# Patient Record
Sex: FEMALE | Race: WHITE | NOT HISPANIC OR LATINO | Employment: OTHER | URBAN - METROPOLITAN AREA
[De-identification: names, ages, dates, MRNs, and addresses within clinical notes are randomized per-mention and may not be internally consistent; named-entity substitution may affect disease eponyms.]

---

## 2019-01-21 ENCOUNTER — HOSPITAL ENCOUNTER (OUTPATIENT)
Dept: MAMMOGRAPHY | Facility: CLINIC | Age: 64
Discharge: HOME | End: 2019-01-21
Attending: OBSTETRICS & GYNECOLOGY | Admitting: OBSTETRICS & GYNECOLOGY
Payer: COMMERCIAL

## 2019-01-21 ENCOUNTER — TRANSFERRED RECORDS (OUTPATIENT)
Dept: HEALTH INFORMATION MANAGEMENT | Facility: CLINIC | Age: 64
End: 2019-01-21

## 2019-01-21 DIAGNOSIS — Z12.31 VISIT FOR SCREENING MAMMOGRAM: ICD-10-CM

## 2019-01-21 LAB
GLUCOSE SERPL-MCNC: 102 MG/DL (ref 74–106)
HBA1C MFR BLD: 5.5 % (ref 0–5.7)
TSH SERPL-ACNC: 0.99 UIU/ML (ref 0.36–3.74)

## 2019-01-21 PROCEDURE — 77063 BREAST TOMOSYNTHESIS BI: CPT

## 2019-01-23 ENCOUNTER — HOSPITAL ENCOUNTER (OUTPATIENT)
Dept: MAMMOGRAPHY | Facility: CLINIC | Age: 64
Discharge: HOME | End: 2019-01-23
Attending: OBSTETRICS & GYNECOLOGY | Admitting: OBSTETRICS & GYNECOLOGY
Payer: COMMERCIAL

## 2019-01-23 DIAGNOSIS — R92.8 ABNORMAL MAMMOGRAM: ICD-10-CM

## 2019-01-23 PROCEDURE — 76642 ULTRASOUND BREAST LIMITED: CPT | Mod: RT

## 2019-02-15 ENCOUNTER — HEALTH MAINTENANCE LETTER (OUTPATIENT)
Age: 64
End: 2019-02-15

## 2019-11-04 ENCOUNTER — HEALTH MAINTENANCE LETTER (OUTPATIENT)
Age: 64
End: 2019-11-04

## 2020-02-01 ENCOUNTER — TRANSFERRED RECORDS (OUTPATIENT)
Dept: HEALTH INFORMATION MANAGEMENT | Facility: CLINIC | Age: 65
End: 2020-02-01

## 2020-02-01 LAB — PAP SMEAR - HIM PATIENT REPORTED: NEGATIVE

## 2020-02-20 ENCOUNTER — TRANSFERRED RECORDS (OUTPATIENT)
Dept: HEALTH INFORMATION MANAGEMENT | Facility: CLINIC | Age: 65
End: 2020-02-20

## 2020-06-12 ENCOUNTER — HOSPITAL ENCOUNTER (OUTPATIENT)
Dept: MAMMOGRAPHY | Facility: CLINIC | Age: 65
Discharge: HOME | End: 2020-06-12
Attending: OBSTETRICS & GYNECOLOGY | Admitting: OBSTETRICS & GYNECOLOGY
Payer: COMMERCIAL

## 2020-06-12 DIAGNOSIS — Z12.31 OTHER SCREENING MAMMOGRAM: ICD-10-CM

## 2020-06-12 PROCEDURE — 77063 BREAST TOMOSYNTHESIS BI: CPT

## 2020-08-04 LAB
ALT SERPL-CCNC: 17 IU/L (ref 5–35)
AST SERPL-CCNC: 21 U/L (ref 5–34)
CREAT SERPL-MCNC: 0.65 MG/DL (ref 0.5–1.3)

## 2020-10-20 ENCOUNTER — TRANSFERRED RECORDS (OUTPATIENT)
Dept: HEALTH INFORMATION MANAGEMENT | Facility: CLINIC | Age: 65
End: 2020-10-20

## 2020-10-20 LAB
ALT SERPL-CCNC: 32 IU/L (ref 5–35)
AST SERPL-CCNC: 22 U/L (ref 5–34)
CREAT SERPL-MCNC: 0.6 MG/DL (ref 0.5–1.3)

## 2020-11-22 ENCOUNTER — HEALTH MAINTENANCE LETTER (OUTPATIENT)
Age: 65
End: 2020-11-22

## 2020-12-07 ENCOUNTER — CARE COORDINATION (OUTPATIENT)
Dept: CARDIOLOGY | Facility: CLINIC | Age: 65
End: 2020-12-07

## 2020-12-07 ENCOUNTER — OFFICE VISIT (OUTPATIENT)
Dept: CARDIOLOGY | Facility: CLINIC | Age: 65
End: 2020-12-07
Payer: COMMERCIAL

## 2020-12-07 VITALS
HEART RATE: 82 BPM | HEIGHT: 62 IN | SYSTOLIC BLOOD PRESSURE: 132 MMHG | DIASTOLIC BLOOD PRESSURE: 82 MMHG | BODY MASS INDEX: 24.25 KG/M2 | WEIGHT: 131.8 LBS

## 2020-12-07 DIAGNOSIS — Z13.6 CARDIOVASCULAR SCREENING; LDL GOAL LESS THAN 160: Primary | ICD-10-CM

## 2020-12-07 DIAGNOSIS — R94.31 NONSPECIFIC ABNORMAL ELECTROCARDIOGRAM (ECG) (EKG): ICD-10-CM

## 2020-12-07 DIAGNOSIS — I45.10 INCOMPLETE RBBB: ICD-10-CM

## 2020-12-07 DIAGNOSIS — I20.89 OTHER FORMS OF ANGINA PECTORIS (H): ICD-10-CM

## 2020-12-07 PROCEDURE — 93000 ELECTROCARDIOGRAM COMPLETE: CPT | Performed by: INTERNAL MEDICINE

## 2020-12-07 PROCEDURE — 99204 OFFICE O/P NEW MOD 45 MIN: CPT | Performed by: INTERNAL MEDICINE

## 2020-12-07 RX ORDER — HYDROXYCHLOROQUINE SULFATE 200 MG/1
300 TABLET, FILM COATED ORAL DAILY
COMMUNITY
End: 2021-02-10

## 2020-12-07 RX ORDER — SULFASALAZINE 500 MG/1
1000 TABLET ORAL 2 TIMES DAILY
COMMUNITY
End: 2021-07-27

## 2020-12-07 RX ORDER — NITROGLYCERIN 0.4 MG/1
TABLET SUBLINGUAL
Qty: 25 TABLET | Refills: 3 | Status: SHIPPED | OUTPATIENT
Start: 2020-12-07 | End: 2023-06-26

## 2020-12-07 ASSESSMENT — MIFFLIN-ST. JEOR: SCORE: 1101.09

## 2020-12-07 NOTE — LETTER
2020    Rachel Campos MD  [facility] [address]  [facility] [address]    RE: Dione KEMP Steve       Dear Colleague,    I had the pleasure of seeing Dione Wilson in the [facility].    HPI and Plan:     Dione is a very pleasant 65-year-old wife of orthopedic surgeon Rufino Wilson.  She began having teeth discomfort in the springtime and summertime.  She is somewhat vague regarding when the discomfort can occur however lately it has had a 2 episodes of exertional discomfort.  She will rest and relax and the discomfort goes away.  She was actually planning on going to [place] with her sons and had to cancel this because she felt so poorly.  She is also had just generalized fatigue and had an episode of nausea and vomiting.  She denies any shortness of breath diaphoresis.  She did have a brother who  of cardiovascular disease at age 60 but he was a heavy drinker and smoker.  She has not had hypertension hyperlipidemia or any vascular disease.  She does have a history of rheumatoid arthritis that I diagnosed recently and is on therapy for that Plaquenil.  Her resting twelve-lead ECG shows incomplete right bundle branch block T wave inversion anteriorly nonspecific ST wave flattening in the anterior precordial leads.    Assessment: Partly exertional teeth discomfort of unclear etiology.  She has seen a dentist and a work-up has been negative.  She has been treated with sulfa for possible sinus infection as just considering other differential diagnoses.  Teeth discomfort for rheumatoid arthritis is not typical and is hard to explain.  I am concerned certainly over and a possible anginal equivalent.  I discussed with her the possibilities of invasive angiography, CT angiography, or stress testing.  I do not think stress testing a high enough sensitivity for her.  I think either invasive angiography or noninvasive CT angiography if this showed  minimal disease and was a high quality study would should be sufficient.  She wished for me to talk with her  and I am happy to do so prior to making definitive decision here.  I did write a prescription for her sublingual nitroglycerin to take as needed which could help with both diagnosis and therapeutic options.    Orders Placed This Encounter   Procedures     CTA Angiogram coronary artery     EKG 12-lead complete w/read - Clinics (performed today)     Orders Placed This Encounter   Medications     sulfaSALAzine (AZULFIDINE) 500 MG tablet     Sig: Take 1,000 mg by mouth 2 times daily     hydroxychloroquine (PLAQUENIL) 200 MG tablet     Sig: Takes 1 tab in am, 1/2 tab in pm     nitroGLYcerin (NITROSTAT) 0.4 MG sublingual tablet     Sig: For chest pain place 1 tablet under the tongue every 5 minutes for 3 doses. If symptoms persist 5 minutes after 1st dose call 911.     Dispense:  25 tablet     Refill:  3     There are no discontinued medications.      Encounter Diagnoses   Name Primary?     CARDIOVASCULAR SCREENING; LDL GOAL LESS THAN 160 Yes     Other forms of angina pectoris (H)      Nonspecific abnormal electrocardiogram (ECG) (EKG)      Incomplete RBBB        CURRENT MEDICATIONS:  Current Outpatient Medications   Medication Sig Dispense Refill     estradiol (ESTRACE) 1 MG tablet Take 0.5 mg by mouth Once a Day        hydroxychloroquine (PLAQUENIL) 200 MG tablet Takes 1 tab in am, 1/2 tab in pm       nitroGLYcerin (NITROSTAT) 0.4 MG sublingual tablet For chest pain place 1 tablet under the tongue every 5 minutes for 3 doses. If symptoms persist 5 minutes after 1st dose call 911. 25 tablet 3     sulfaSALAzine (AZULFIDINE) 500 MG tablet Take 1,000 mg by mouth 2 times daily         ALLERGIES     Allergies   Allergen Reactions     Minocycline      Daughter and son reaction     Tetracycline      Daughter and son allergy       PAST MEDICAL HISTORY:  History reviewed. No pertinent past medical history.    PAST  SURGICAL HISTORY:  Past Surgical History:   Procedure Laterality Date     COLONOSCOPY       COLONOSCOPY N/A 12/6/2016    Procedure: COLONOSCOPY;  Surgeon: Philip Santiago MD;  Location:  GI     ENT SURGERY      T&A     GYN SURGERY      vag hyst       FAMILY HISTORY:  Family History   Problem Relation Age of Onset     Diabetes Father      Diabetes Brother        SOCIAL HISTORY:  Social History     Socioeconomic History     Marital status:      Spouse name: None     Number of children: None     Years of education: None     Highest education level: None   Occupational History     None   Social Needs     Financial resource strain: None     Food insecurity     Worry: None     Inability: None     Transportation needs     Medical: None     Non-medical: None   Tobacco Use     Smoking status: Never Smoker     Smokeless tobacco: Never Used   Substance and Sexual Activity     Alcohol use: Yes     Comment: 2-4 per week     Drug use: No     Sexual activity: Yes     Partners: Male   Lifestyle     Physical activity     Days per week: None     Minutes per session: None     Stress: None   Relationships     Social connections     Talks on phone: None     Gets together: None     Attends Orthodoxy service: None     Active member of club or organization: None     Attends meetings of clubs or organizations: None     Relationship status: None     Intimate partner violence     Fear of current or ex partner: None     Emotionally abused: None     Physically abused: None     Forced sexual activity: None   Other Topics Concern     Parent/sibling w/ CABG, MI or angioplasty before 65F 55M? Not Asked   Social History Narrative     None       Review of Systems:  Skin:  Positive for rash   Eyes:  Positive for glasses  ENT:  Negative    Respiratory:  Negative    Cardiovascular:    Positive for;chest pain;palpitations;edema  Gastroenterology: Positive for nausea  Genitourinary:  Negative    Musculoskeletal:  Positive for  "arthritis  Neurologic:  Positive for headaches  Psychiatric:  Positive for excessive stress  Heme/Lymph/Imm:  Positive for allergies  Endocrine:  Negative          Physical Exam:  Vitals: /82   Pulse 82   Ht 1.575 m (5' 2\")   Wt 59.8 kg (131 lb 12.8 oz)   BMI 24.11 kg/m    Constitutional: cooperative, alert and oriented, well developed, well nourished, in no acute distress   Skin: warm and dry to the touch, no apparent skin lesions or masses noted   Head: normocephalic, no masses or lesions   Eyes: pupils equal and round, conjunctivae and lids unremarkable, sclera white, no xanthalasma, EOMS intact, no nystagmus   ENT: no pallor or cyanosis, dentition good   Neck: carotid pulses are full and equal bilaterally, JVP normal, no carotid bruit, no thyromegaly   Chest: normal breath sounds, clear to auscultation, normal A-P diameter, normal symmetry, normal respiratory excursion, no use of accessory muscles   Cardiac: regular rhythm, normal S1/S2, no S3 or S4, apical impulse not displaced, no murmurs, gallops or rubs no presence of murmur   Abdomen: abdomen soft, non-tender, BS normoactive, no mass, no HSM, no bruits   Vascular: pulses full and equal, no bruits auscultated   Extremities and Back: no deformities, clubbing, cyanosis, erythema observed   Neurological: affect appropriate, oriented to time, person and place     Recent Lab Results:  LIPID RESULTS:  No results found for: CHOL, HDL, LDL, TRIG, CHOLHDLRATIO    LIVER ENZYME RESULTS:  No results found for: AST, ALT    CBC RESULTS:  No results found for: WBC, RBC, HGB, HCT, MCV, MCH, MCHC, RDW, PLT    BMP RESULTS:  No results found for: NA, POTASSIUM, CHLORIDE, CO2, ANIONGAP, GLC, BUN, CR, GFRESTIMATED, GFRESTBLACK, BRANDIE     A1C RESULTS:  No results found for: A1C    INR RESULTS:  No results found for: INR      Thank you for allowing me to participate in the care of your patient.    Sincerely,     WILIAM EMMANUEL MD     [facility]

## 2020-12-07 NOTE — PROGRESS NOTES
HPI and Plan:     Dione is a very pleasant 65-year-old wife of orthopedic surgeon Rufino Wilson.  She began having teeth discomfort in the springtime and summertime.  She is somewhat vague regarding when the discomfort can occur however lately it has had a 2 episodes of exertional discomfort.  She will rest and relax and the discomfort goes away.  She was actually planning on going to [place] with her sons and had to cancel this because she felt so poorly.  She is also had just generalized fatigue and had an episode of nausea and vomiting.  She denies any shortness of breath diaphoresis.  She did have a brother who  of cardiovascular disease at age 60 but he was a heavy drinker and smoker.  She has not had hypertension hyperlipidemia or any vascular disease.  She does have a history of rheumatoid arthritis that I diagnosed recently and is on therapy for that Plaquenil.  Her resting twelve-lead ECG shows incomplete right bundle branch block T wave inversion anteriorly nonspecific ST wave flattening in the anterior precordial leads.    Assessment: Partly exertional teeth discomfort of unclear etiology.  She has seen a dentist and a work-up has been negative.  She has been treated with sulfa for possible sinus infection as just considering other differential diagnoses.  Teeth discomfort for rheumatoid arthritis is not typical and is hard to explain.  I am concerned certainly over and a possible anginal equivalent.  I discussed with her the possibilities of invasive angiography, CT angiography, or stress testing.  I do not think stress testing a high enough sensitivity for her.  I think either invasive angiography or noninvasive CT angiography if this showed minimal disease and was a high quality study would should be sufficient.  She wished for me to talk with her  and I am happy to do so prior to making definitive decision here.  I did write a prescription for her sublingual nitroglycerin to take as needed  which could help with both diagnosis and therapeutic options.    Orders Placed This Encounter   Procedures     CTA Angiogram coronary artery     EKG 12-lead complete w/read - Clinics (performed today)     Orders Placed This Encounter   Medications     sulfaSALAzine (AZULFIDINE) 500 MG tablet     Sig: Take 1,000 mg by mouth 2 times daily     hydroxychloroquine (PLAQUENIL) 200 MG tablet     Sig: Takes 1 tab in am, 1/2 tab in pm     nitroGLYcerin (NITROSTAT) 0.4 MG sublingual tablet     Sig: For chest pain place 1 tablet under the tongue every 5 minutes for 3 doses. If symptoms persist 5 minutes after 1st dose call 911.     Dispense:  25 tablet     Refill:  3     There are no discontinued medications.      Encounter Diagnoses   Name Primary?     CARDIOVASCULAR SCREENING; LDL GOAL LESS THAN 160 Yes     Other forms of angina pectoris (H)      Nonspecific abnormal electrocardiogram (ECG) (EKG)      Incomplete RBBB        CURRENT MEDICATIONS:  Current Outpatient Medications   Medication Sig Dispense Refill     estradiol (ESTRACE) 1 MG tablet Take 0.5 mg by mouth Once a Day        hydroxychloroquine (PLAQUENIL) 200 MG tablet Takes 1 tab in am, 1/2 tab in pm       nitroGLYcerin (NITROSTAT) 0.4 MG sublingual tablet For chest pain place 1 tablet under the tongue every 5 minutes for 3 doses. If symptoms persist 5 minutes after 1st dose call 911. 25 tablet 3     sulfaSALAzine (AZULFIDINE) 500 MG tablet Take 1,000 mg by mouth 2 times daily         ALLERGIES     Allergies   Allergen Reactions     Minocycline      Daughter and son reaction     Tetracycline      Daughter and son allergy       PAST MEDICAL HISTORY:  History reviewed. No pertinent past medical history.    PAST SURGICAL HISTORY:  Past Surgical History:   Procedure Laterality Date     COLONOSCOPY       COLONOSCOPY N/A 12/6/2016    Procedure: COLONOSCOPY;  Surgeon: Philip Santiago MD;  Location:  GI     ENT SURGERY      T&A     GYN SURGERY      [facility]  "      FAMILY HISTORY:  Family History   Problem Relation Age of Onset     Diabetes Father      Diabetes Brother        SOCIAL HISTORY:  Social History     Socioeconomic History     Marital status:      Spouse name: None     Number of children: None     Years of education: None     Highest education level: None   Occupational History     None   Social Needs     Financial resource strain: None     Food insecurity     Worry: None     Inability: None     Transportation needs     Medical: None     Non-medical: None   Tobacco Use     Smoking status: Never Smoker     Smokeless tobacco: Never Used   Substance and Sexual Activity     Alcohol use: Yes     Comment: 2-4 per week     Drug use: No     Sexual activity: Yes     Partners: Male   Lifestyle     Physical activity     Days per week: None     Minutes per session: None     Stress: None   Relationships     Social connections     Talks on phone: None     Gets together: None     Attends Amish service: None     Active member of club or organization: None     Attends meetings of clubs or organizations: None     Relationship status: None     Intimate partner violence     Fear of current or ex partner: None     Emotionally abused: None     Physically abused: None     Forced sexual activity: None   Other Topics Concern     Parent/sibling w/ CABG, MI or angioplasty before 65F 55M? Not Asked   Social History Narrative     None       Review of Systems:  Skin:  Positive for rash   Eyes:  Positive for glasses  ENT:  Negative    Respiratory:  Negative    Cardiovascular:    Positive for;chest pain;palpitations;edema  Gastroenterology: Positive for nausea  Genitourinary:  Negative    Musculoskeletal:  Positive for arthritis  Neurologic:  Positive for headaches  Psychiatric:  Positive for excessive stress  Heme/Lymph/Imm:  Positive for allergies  Endocrine:  Negative            Physical Exam:  Vitals: /82   Pulse 82   Ht 1.575 m (5' 2\")   Wt 59.8 kg (131 lb 12.8 oz)   " BMI 24.11 kg/m    Constitutional: cooperative, alert and oriented, well developed, well nourished, in no acute distress   Skin: warm and dry to the touch, no apparent skin lesions or masses noted   Head: normocephalic, no masses or lesions   Eyes: pupils equal and round, conjunctivae and lids unremarkable, sclera white, no xanthalasma, EOMS intact, no nystagmus   ENT: no pallor or cyanosis, dentition good   Neck: carotid pulses are full and equal bilaterally, JVP normal, no carotid bruit, no thyromegaly   Chest: normal breath sounds, clear to auscultation, normal A-P diameter, normal symmetry, normal respiratory excursion, no use of accessory muscles   Cardiac: regular rhythm, normal S1/S2, no S3 or S4, apical impulse not displaced, no murmurs, gallops or rubs no presence of murmur   Abdomen: abdomen soft, non-tender, BS normoactive, no mass, no HSM, no bruits   Vascular: pulses full and equal, no bruits auscultated   Extremities and Back: no deformities, clubbing, cyanosis, erythema observed   Neurological: affect appropriate, oriented to time, person and place     Recent Lab Results:  LIPID RESULTS:  No results found for: CHOL, HDL, LDL, TRIG, CHOLHDLRATIO    LIVER ENZYME RESULTS:  No results found for: AST, ALT    CBC RESULTS:  No results found for: WBC, RBC, HGB, HCT, MCV, MCH, MCHC, RDW, PLT    BMP RESULTS:  No results found for: NA, POTASSIUM, CHLORIDE, CO2, ANIONGAP, GLC, BUN, CR, GFRESTIMATED, GFRESTBLACK, BRANDIE     A1C RESULTS:  No results found for: A1C    INR RESULTS:  No results found for: INR        CC  Rachel Campos MD  [facility]  [address]  [place] [address]

## 2020-12-07 NOTE — PROGRESS NOTES
Patient has been having jaw discomfort whenever she is active.  No cardiac history.  Does have h/o rheumatoid arthritis.  Dr. López will see patient in clinic today.

## 2020-12-07 NOTE — LETTER
2020    Rachel Campos MD  [facility] [address]  [facility] [address]    RE: Dione KEMP Steve       Dear Colleague,    I had the pleasure of seeing Dione Wilson in the [facility].    HPI and Plan:     Dione is a very pleasant 65-year-old wife of orthopedic surgeon Rufnio Wilson.  She began having teeth discomfort in the springtime and summertime.  She is somewhat vague regarding when the discomfort can occur however lately it has had a 2 episodes of exertional discomfort.  She will rest and relax and the discomfort goes away.  She was actually planning on going to [place] with her sons and had to cancel this because she felt so poorly.  She is also had just generalized fatigue and had an episode of nausea and vomiting.  She denies any shortness of breath diaphoresis.  She did have a brother who  of cardiovascular disease at age 60 but he was a heavy drinker and smoker.  She has not had hypertension hyperlipidemia or any vascular disease.  She does have a history of rheumatoid arthritis that I diagnosed recently and is on therapy for that Plaquenil.  Her resting twelve-lead ECG shows incomplete right bundle branch block T wave inversion anteriorly nonspecific ST wave flattening in the anterior precordial leads.    Assessment: Partly exertional teeth discomfort of unclear etiology.  She has seen a dentist and a work-up has been negative.  She has been treated with sulfa for possible sinus infection as just considering other differential diagnoses.  Teeth discomfort for rheumatoid arthritis is not typical and is hard to explain.  I am concerned certainly over and a possible anginal equivalent.  I discussed with her the possibilities of invasive angiography, CT angiography, or stress testing.  I do not think stress testing a high enough sensitivity for her.  I think either invasive angiography or noninvasive CT angiography if this showed  minimal disease and was a high quality study would should be sufficient.  She wished for me to talk with her  and I am happy to do so prior to making definitive decision here.  I did write a prescription for her sublingual nitroglycerin to take as needed which could help with both diagnosis and therapeutic options.    Orders Placed This Encounter   Procedures     CTA Angiogram coronary artery     EKG 12-lead complete w/read - Clinics (performed today)     Orders Placed This Encounter   Medications     sulfaSALAzine (AZULFIDINE) 500 MG tablet     Sig: Take 1,000 mg by mouth 2 times daily     hydroxychloroquine (PLAQUENIL) 200 MG tablet     Sig: Takes 1 tab in am, 1/2 tab in pm     nitroGLYcerin (NITROSTAT) 0.4 MG sublingual tablet     Sig: For chest pain place 1 tablet under the tongue every 5 minutes for 3 doses. If symptoms persist 5 minutes after 1st dose call 911.     Dispense:  25 tablet     Refill:  3     There are no discontinued medications.      Encounter Diagnoses   Name Primary?     CARDIOVASCULAR SCREENING; LDL GOAL LESS THAN 160 Yes     Other forms of angina pectoris (H)      Nonspecific abnormal electrocardiogram (ECG) (EKG)      Incomplete RBBB        CURRENT MEDICATIONS:  Current Outpatient Medications   Medication Sig Dispense Refill     estradiol (ESTRACE) 1 MG tablet Take 0.5 mg by mouth Once a Day        hydroxychloroquine (PLAQUENIL) 200 MG tablet Takes 1 tab in am, 1/2 tab in pm       nitroGLYcerin (NITROSTAT) 0.4 MG sublingual tablet For chest pain place 1 tablet under the tongue every 5 minutes for 3 doses. If symptoms persist 5 minutes after 1st dose call 911. 25 tablet 3     sulfaSALAzine (AZULFIDINE) 500 MG tablet Take 1,000 mg by mouth 2 times daily         ALLERGIES     Allergies   Allergen Reactions     Minocycline      Daughter and son reaction     Tetracycline      Daughter and son allergy       PAST MEDICAL HISTORY:  History reviewed. No pertinent past medical history.    PAST  SURGICAL HISTORY:  Past Surgical History:   Procedure Laterality Date     COLONOSCOPY       COLONOSCOPY N/A 12/6/2016    Procedure: COLONOSCOPY;  Surgeon: Philip Santiago MD;  Location:  GI     ENT SURGERY      T&A     GYN SURGERY      vag hyst       FAMILY HISTORY:  Family History   Problem Relation Age of Onset     Diabetes Father      Diabetes Brother        SOCIAL HISTORY:  Social History     Socioeconomic History     Marital status:      Spouse name: None     Number of children: None     Years of education: None     Highest education level: None   Occupational History     None   Social Needs     Financial resource strain: None     Food insecurity     Worry: None     Inability: None     Transportation needs     Medical: None     Non-medical: None   Tobacco Use     Smoking status: Never Smoker     Smokeless tobacco: Never Used   Substance and Sexual Activity     Alcohol use: Yes     Comment: 2-4 per week     Drug use: No     Sexual activity: Yes     Partners: Male   Lifestyle     Physical activity     Days per week: None     Minutes per session: None     Stress: None   Relationships     Social connections     Talks on phone: None     Gets together: None     Attends Orthodox service: None     Active member of club or organization: None     Attends meetings of clubs or organizations: None     Relationship status: None     Intimate partner violence     Fear of current or ex partner: None     Emotionally abused: None     Physically abused: None     Forced sexual activity: None   Other Topics Concern     Parent/sibling w/ CABG, MI or angioplasty before 65F 55M? Not Asked   Social History Narrative     None       Review of Systems:  Skin:  Positive for rash   Eyes:  Positive for glasses  ENT:  Negative    Respiratory:  Negative    Cardiovascular:    Positive for;chest pain;palpitations;edema  Gastroenterology: Positive for nausea  Genitourinary:  Negative    Musculoskeletal:  Positive for  "arthritis  Neurologic:  Positive for headaches  Psychiatric:  Positive for excessive stress  Heme/Lymph/Imm:  Positive for allergies  Endocrine:  Negative            Physical Exam:  Vitals: /82   Pulse 82   Ht 1.575 m (5' 2\")   Wt 59.8 kg (131 lb 12.8 oz)   BMI 24.11 kg/m    Constitutional: cooperative, alert and oriented, well developed, well nourished, in no acute distress   Skin: warm and dry to the touch, no apparent skin lesions or masses noted   Head: normocephalic, no masses or lesions   Eyes: pupils equal and round, conjunctivae and lids unremarkable, sclera white, no xanthalasma, EOMS intact, no nystagmus   ENT: no pallor or cyanosis, dentition good   Neck: carotid pulses are full and equal bilaterally, JVP normal, no carotid bruit, no thyromegaly   Chest: normal breath sounds, clear to auscultation, normal A-P diameter, normal symmetry, normal respiratory excursion, no use of accessory muscles   Cardiac: regular rhythm, normal S1/S2, no S3 or S4, apical impulse not displaced, no murmurs, gallops or rubs no presence of murmur   Abdomen: abdomen soft, non-tender, BS normoactive, no mass, no HSM, no bruits   Vascular: pulses full and equal, no bruits auscultated   Extremities and Back: no deformities, clubbing, cyanosis, erythema observed   Neurological: affect appropriate, oriented to time, person and place     Recent Lab Results:  LIPID RESULTS:  No results found for: CHOL, HDL, LDL, TRIG, CHOLHDLRATIO    LIVER ENZYME RESULTS:  No results found for: AST, ALT    CBC RESULTS:  No results found for: WBC, RBC, HGB, HCT, MCV, MCH, MCHC, RDW, PLT    BMP RESULTS:  No results found for: NA, POTASSIUM, CHLORIDE, CO2, ANIONGAP, GLC, BUN, CR, GFRESTIMATED, GFRESTBLACK, BRANDIE     A1C RESULTS:  No results found for: A1C    INR RESULTS:  No results found for: INR        CC  Rachel Campos MD  [facility]  [address]  [place] [address]                      Thank you for allowing me " to participate in the care of your patient.      Sincerely,     WILIAM EMMANUEL MD     [facility]    cc:   Rachel Campos MD  [facility]  [address] [address]

## 2020-12-08 ENCOUNTER — HOSPITAL ENCOUNTER (OUTPATIENT)
Facility: CLINIC | Age: 65
Discharge: HOME | End: 2020-12-08
Attending: INTERNAL MEDICINE | Admitting: INTERNAL MEDICINE
Payer: COMMERCIAL

## 2020-12-08 VITALS
OXYGEN SATURATION: 94 % | TEMPERATURE: 98.7 F | HEART RATE: 82 BPM | WEIGHT: 127.4 LBS | HEIGHT: 62 IN | DIASTOLIC BLOOD PRESSURE: 75 MMHG | RESPIRATION RATE: 16 BRPM | BODY MASS INDEX: 23.45 KG/M2 | SYSTOLIC BLOOD PRESSURE: 95 MMHG

## 2020-12-08 DIAGNOSIS — I20.89 OTHER FORMS OF ANGINA PECTORIS (H): ICD-10-CM

## 2020-12-08 DIAGNOSIS — I20.89 OTHER FORMS OF ANGINA PECTORIS (H): Primary | ICD-10-CM

## 2020-12-08 DIAGNOSIS — Z13.6 CARDIOVASCULAR SCREENING; LDL GOAL LESS THAN 160: Primary | ICD-10-CM

## 2020-12-08 PROBLEM — Z98.890 STATUS POST CORONARY ANGIOGRAM: Status: ACTIVE | Noted: 2020-12-08

## 2020-12-08 LAB
ANION GAP SERPL CALCULATED.3IONS-SCNC: 2 MMOL/L (ref 3–14)
APTT PPP: 26 SEC (ref 22–37)
BUN SERPL-MCNC: 11 MG/DL (ref 7–30)
CALCIUM SERPL-MCNC: 8.5 MG/DL (ref 8.5–10.1)
CHLORIDE SERPL-SCNC: 107 MMOL/L (ref 94–109)
CHOLEST SERPL-MCNC: 227 MG/DL
CO2 SERPL-SCNC: 30 MMOL/L (ref 20–32)
CREAT SERPL-MCNC: 0.59 MG/DL (ref 0.52–1.04)
ERYTHROCYTE [DISTWIDTH] IN BLOOD BY AUTOMATED COUNT: 13.4 % (ref 10–15)
GFR SERPL CREATININE-BSD FRML MDRD: >90 ML/MIN/{1.73_M2}
GLUCOSE SERPL-MCNC: 89 MG/DL (ref 70–99)
HCT VFR BLD AUTO: 38.6 % (ref 35–47)
HDLC SERPL-MCNC: 90 MG/DL
HGB BLD-MCNC: 12.4 G/DL (ref 11.7–15.7)
INR PPP: 1.05 (ref 0.86–1.14)
LABORATORY COMMENT REPORT: NORMAL
LDLC SERPL CALC-MCNC: 120 MG/DL
MCH RBC QN AUTO: 31.2 PG (ref 26.5–33)
MCHC RBC AUTO-ENTMCNC: 32.1 G/DL (ref 31.5–36.5)
MCV RBC AUTO: 97 FL (ref 78–100)
NONHDLC SERPL-MCNC: 137 MG/DL
PLATELET # BLD AUTO: 390 10E9/L (ref 150–450)
POTASSIUM SERPL-SCNC: 3.6 MMOL/L (ref 3.4–5.3)
RBC # BLD AUTO: 3.98 10E12/L (ref 3.8–5.2)
SARS-COV-2 RNA SPEC QL NAA+PROBE: NEGATIVE
SARS-COV-2 RNA SPEC QL NAA+PROBE: NORMAL
SODIUM SERPL-SCNC: 139 MMOL/L (ref 133–144)
SPECIMEN SOURCE: NORMAL
SPECIMEN SOURCE: NORMAL
TRIGL SERPL-MCNC: 87 MG/DL
WBC # BLD AUTO: 4.3 10E9/L (ref 4–11)

## 2020-12-08 PROCEDURE — 80061 LIPID PANEL: CPT | Performed by: INTERNAL MEDICINE

## 2020-12-08 PROCEDURE — 250N000009 HC RX 250: Performed by: INTERNAL MEDICINE

## 2020-12-08 PROCEDURE — 999N000184 HC STATISTIC TELEMETRY

## 2020-12-08 PROCEDURE — 99152 MOD SED SAME PHYS/QHP 5/>YRS: CPT | Performed by: INTERNAL MEDICINE

## 2020-12-08 PROCEDURE — 250N000013 HC RX MED GY IP 250 OP 250 PS 637: Performed by: INTERNAL MEDICINE

## 2020-12-08 PROCEDURE — 272N000001 HC OR GENERAL SUPPLY STERILE: Performed by: INTERNAL MEDICINE

## 2020-12-08 PROCEDURE — 85027 COMPLETE CBC AUTOMATED: CPT | Performed by: INTERNAL MEDICINE

## 2020-12-08 PROCEDURE — 258N000003 HC RX IP 258 OP 636: Performed by: INTERNAL MEDICINE

## 2020-12-08 PROCEDURE — 250N000011 HC RX IP 250 OP 636: Performed by: INTERNAL MEDICINE

## 2020-12-08 PROCEDURE — 999N000071 HC STATISTIC HEART CATH LAB OR EP LAB

## 2020-12-08 PROCEDURE — 36415 COLL VENOUS BLD VENIPUNCTURE: CPT

## 2020-12-08 PROCEDURE — 80061 LIPID PANEL: CPT | Performed by: STUDENT IN AN ORGANIZED HEALTH CARE EDUCATION/TRAINING PROGRAM

## 2020-12-08 PROCEDURE — 85610 PROTHROMBIN TIME: CPT | Performed by: INTERNAL MEDICINE

## 2020-12-08 PROCEDURE — 93010 ELECTROCARDIOGRAM REPORT: CPT | Performed by: INTERNAL MEDICINE

## 2020-12-08 PROCEDURE — U0003 INFECTIOUS AGENT DETECTION BY NUCLEIC ACID (DNA OR RNA); SEVERE ACUTE RESPIRATORY SYNDROME CORONAVIRUS 2 (SARS-COV-2) (CORONAVIRUS DISEASE [COVID-19]), AMPLIFIED PROBE TECHNIQUE, MAKING USE OF HIGH THROUGHPUT TECHNOLOGIES AS DESCRIBED BY CMS-2020-01-R: HCPCS | Performed by: INTERNAL MEDICINE

## 2020-12-08 PROCEDURE — 85730 THROMBOPLASTIN TIME PARTIAL: CPT | Mod: GZ | Performed by: INTERNAL MEDICINE

## 2020-12-08 PROCEDURE — 80048 BASIC METABOLIC PNL TOTAL CA: CPT | Performed by: INTERNAL MEDICINE

## 2020-12-08 PROCEDURE — 93005 ELECTROCARDIOGRAM TRACING: CPT

## 2020-12-08 PROCEDURE — 93458 L HRT ARTERY/VENTRICLE ANGIO: CPT | Performed by: INTERNAL MEDICINE

## 2020-12-08 RX ORDER — SODIUM CHLORIDE 9 MG/ML
INJECTION, SOLUTION INTRAVENOUS CONTINUOUS
Status: CANCELLED | OUTPATIENT
Start: 2020-12-08

## 2020-12-08 RX ORDER — NALOXONE HYDROCHLORIDE 0.4 MG/ML
0.2 INJECTION, SOLUTION INTRAMUSCULAR; INTRAVENOUS; SUBCUTANEOUS
Status: DISCONTINUED | OUTPATIENT
Start: 2020-12-08 | End: 2020-12-08 | Stop reason: HOSPADM

## 2020-12-08 RX ORDER — FENTANYL CITRATE 50 UG/ML
25-50 INJECTION, SOLUTION INTRAMUSCULAR; INTRAVENOUS
Status: ACTIVE | OUTPATIENT
Start: 2020-12-08 | End: 2020-12-08

## 2020-12-08 RX ORDER — ATROPINE SULFATE 0.1 MG/ML
0.5 INJECTION INTRAVENOUS
Status: DISCONTINUED | OUTPATIENT
Start: 2020-12-08 | End: 2020-12-08 | Stop reason: HOSPADM

## 2020-12-08 RX ORDER — LIDOCAINE 40 MG/G
CREAM TOPICAL
Status: DISCONTINUED | OUTPATIENT
Start: 2020-12-08 | End: 2020-12-08 | Stop reason: HOSPADM

## 2020-12-08 RX ORDER — ISOSORBIDE MONONITRATE 30 MG/1
15 TABLET, EXTENDED RELEASE ORAL DAILY
Qty: 30 TABLET | Refills: 1 | Status: SHIPPED | OUTPATIENT
Start: 2020-12-08 | End: 2020-12-16

## 2020-12-08 RX ORDER — LIDOCAINE 40 MG/G
CREAM TOPICAL
Status: CANCELLED | OUTPATIENT
Start: 2020-12-08

## 2020-12-08 RX ORDER — NALOXONE HYDROCHLORIDE 0.4 MG/ML
0.4 INJECTION, SOLUTION INTRAMUSCULAR; INTRAVENOUS; SUBCUTANEOUS
Status: DISCONTINUED | OUTPATIENT
Start: 2020-12-08 | End: 2020-12-08 | Stop reason: HOSPADM

## 2020-12-08 RX ORDER — FENTANYL CITRATE 50 UG/ML
INJECTION, SOLUTION INTRAMUSCULAR; INTRAVENOUS
Status: DISCONTINUED | OUTPATIENT
Start: 2020-12-08 | End: 2020-12-08 | Stop reason: HOSPADM

## 2020-12-08 RX ORDER — POTASSIUM CHLORIDE 1500 MG/1
20 TABLET, EXTENDED RELEASE ORAL
Status: COMPLETED | OUTPATIENT
Start: 2020-12-08 | End: 2020-12-08

## 2020-12-08 RX ORDER — IOPAMIDOL 755 MG/ML
INJECTION, SOLUTION INTRAVASCULAR
Status: DISCONTINUED | OUTPATIENT
Start: 2020-12-08 | End: 2020-12-08 | Stop reason: HOSPADM

## 2020-12-08 RX ORDER — SODIUM CHLORIDE 9 MG/ML
INJECTION, SOLUTION INTRAVENOUS CONTINUOUS
Status: DISCONTINUED | OUTPATIENT
Start: 2020-12-08 | End: 2020-12-08 | Stop reason: HOSPADM

## 2020-12-08 RX ORDER — FLUMAZENIL 0.1 MG/ML
0.2 INJECTION, SOLUTION INTRAVENOUS
Status: DISCONTINUED | OUTPATIENT
Start: 2020-12-08 | End: 2020-12-08 | Stop reason: HOSPADM

## 2020-12-08 RX ORDER — ROSUVASTATIN CALCIUM 20 MG/1
20 TABLET, COATED ORAL DAILY
Qty: 90 TABLET | Refills: 1 | Status: SHIPPED | OUTPATIENT
Start: 2020-12-08 | End: 2020-12-09

## 2020-12-08 RX ORDER — POTASSIUM CHLORIDE 1500 MG/1
20 TABLET, EXTENDED RELEASE ORAL
Status: CANCELLED | OUTPATIENT
Start: 2020-12-08

## 2020-12-08 RX ORDER — ACETAMINOPHEN 325 MG/1
650 TABLET ORAL EVERY 4 HOURS PRN
Status: DISCONTINUED | OUTPATIENT
Start: 2020-12-08 | End: 2020-12-08 | Stop reason: HOSPADM

## 2020-12-08 RX ADMIN — POTASSIUM CHLORIDE 20 MEQ: 1500 TABLET, EXTENDED RELEASE ORAL at 10:23

## 2020-12-08 RX ADMIN — SODIUM CHLORIDE: 9 INJECTION, SOLUTION INTRAVENOUS at 09:46

## 2020-12-08 ASSESSMENT — MIFFLIN-ST. JEOR: SCORE: 1081.13

## 2020-12-08 NOTE — PROGRESS NOTES
Dr. López has decided he would like to proceed with coronary angiogram today due to patient's symptoms of jaw discomfort with activity.  The plan is to have patient scheduled today.  Dr. López is aware that patient will not have time for COVID-19 testing and will have the cath team take extra precautions.    [facility] - ANGIOGRAM INSTRUCTIONS:    Dione Wilson is scheduled for an angiogram at [facility] on 20. Advised patient not eat or drink after midnight on 20 .   Patient advised to take morning medications with a sip of water on the day of the procedure, except the followin. Aspirin: Patient is currently taking 325 mg of aspirin, patient advised to continue same dose.   2. Diabetic Medications: Patient does not take Metformin or other diabetic medications.  3. Anticoagulants: Patient does not take an anticoagulant.  4. Diuretics: Patient does not take diuretics.  Verified patient does not have an allergy to contrast dye.  Verified patient has someone available to drive them home from the hospital and can stay with them for 24 hours after the procedure.     Angiogram orders have been signed & held.    Ellie Ku RN  [facility]

## 2020-12-08 NOTE — PROGRESS NOTES
Care Suites Post Procedure Note    Patient Information  Name: Dione iWlson  Age: 65 year old    Post Procedure  Time patient returned to Care Suites: 1205  Concerns/abnormal assessment: no  If abnormal assessment, provider notified: N/A  Plan/Other: monitor.    Luis Dumont, RN     1330 Pt CO lights in peripheral vision and sl. HA. Neuro check WDL. Dr. López notified. Fluids Increased to 150cc hr. Lunch and liquids encouraged.  1335 Pt states lights in peripheral vision resolved.

## 2020-12-08 NOTE — PROGRESS NOTES
1520 Report received from Herb Lopez RN.  1530 Pt A/O. Gauze drsg CDI to right groin puncture site. No oozing or hematoma noted. Area soft & flat. Pt denies pain. OOB - steady on feet. Ambulated in halls to bathroom with good sorin. No change in puncture site assessment with activity.  1540 Discharge teaching & instructions reinforced with pt w/ verbal understanding received. All questions & concerns addressed.  1600 Pt discharged per w/c to private vehicle. All personal belongings taken w/ pt.

## 2020-12-08 NOTE — PROGRESS NOTES
1445: Discharge teaching & instructions given to pt with verbal understanding received. All questions & concerns addressed.     Detailed report given to Fatimah ANDERSON for shift change.

## 2020-12-08 NOTE — DISCHARGE INSTRUCTIONS
Cardiac Angiogram Discharge Instructions - Femoral    After you go home:      Have an adult stay with you until tomorrow.    Drink extra fluids for 2 days.    You may resume your normal diet.    No smoking       For 24 hours - due to the sedation you received:    Relax and take it easy.    Do NOT make any important or legal decisions.    Do NOT drive or operate machines at home or at work.    Do NOT drink alcohol.    Care of Groin Puncture Site:      For the first 24 hrs - check the puncture site every 1-2 hours while awake.    For 2 days, when you cough, sneeze, laugh or move your bowels, hold your hand over the puncture site and press firmly.    Remove the bandaid after 24 hours. If there is minor oozing, apply another bandaid and remove it after 12 hours.    It is normal to have a small bruise or pea size lump at the site.    You may shower tomorrow. Do NOT take a bath, or use a hot tub or pool for at least 3 days. Do NOT scrub the site. Do not use lotion or powder near the puncture site.    Activity:            For 2 days:    No stooping or squatting    Do NOT do any heavy activity such as exercise, lifting, or straining.     No housework, yard work or any activity that make you sweat    Do NOT lift more than 10 pounds    Bleeding:      If you start bleeding from the site in your groin, lie down flat and press firmly on/above the site for 10 minutes.     Once bleeding stops, lay flat for 2 hours.     Call [facility] as soon as you can.       Call 911 right away if you have heavy bleeding or bleeding that does not stop.      Medicines:      If you are taking an antiplatelet medication such as Plavix, Brilinta or Effient, do not stop taking it until you talk to your cardiologist.      If you have stopped any medicines, check with your provider about when to restart them.    Follow Up Appointments:      Follow up with [facility] Nurse Practitioner at [facility] of patient preference in 7-10 days.    Call the  clinic if:      You have increased pain or a large or growing hard lump around the site.    The site is red, swollen, hot or tender.    Blood or fluid is draining from the site.    You have chills or a fever greater than 101 F (38 C).    Your leg feels numb, cool or changes color.    You have hives, a rash or unusual itching.    New pain in the back or belly that you cannot control with Tylenol.    Any questions or concerns.          [facility]:    [phone] [facility] (7 days a week)

## 2020-12-08 NOTE — PROGRESS NOTES
Care Suites Admission Nursing Note    Patient Information  Name: Dione Wilson  Age: 65 year old  Reason for admission: Heart cath  Care Suites arrival time: 0900    Visitor Information  Name: n/a  Informed of visitor restrictions: N/A  1 visitor allowed per patient   Visitor must screen negative for COVID symptoms   Visitor must wear a mask  Waiting rooms closed to visitors    Patient Admission/Assessment   Pre-procedure assessment complete: Yes  If abnormal assessment/labs, provider notified: N/A  NPO: Yes  Medications held per instructions/orders: Yes  Consent: obtained  If applicable, pregnancy test status: n/a  Patient oriented to room: Yes  Education/questions answered: Yes  Plan/other: proceed    Discharge Planning  Discharge name/phone number: Ed- spouse   Overnight post sedation caregiver: Ed  Discharge location: home  PATIENT/VISITOR WELLNESS SCREENING    Step 1 Patient Screening    1. In the last month, have you been in contact with someone who was confirmed or suspected to have Coronavirus/COVID-19? No    2. Do you have the following symptoms?  Fever/Chills? No   Cough? No   Shortness of breath? No   New loss of taste or smell? No  Sore throat? No  Muscle or body aches? No  Headaches? No  Fatigue? No  Vomiting or diarrhea? No    Step 2 Visitor Screening    1. Name of Visitor (1 visitor per patient): n/a      If the visitor has positive symptoms, notify supervisor/manger  Per policy, the visitor will need to leave the facility     Step 3 Refer to logic grid below for actions    NO SYMPTOM(S)    ACTIONS:  1. Standard rooming process  2. Provider to assess per normal protocol  3. Implement precautions as needed and per guidelines     POSITIVE SYMPTOM(S)  If positive for ANY of the following symptoms: fever, cough, shortness of breath, rash    ACTION:  1. Continue to have the patient wear a mask   2. Room patient as soon as possible  3. Don appropriate PPE when entering room  4. Provider  evaluation    Herb Lopez, RN

## 2020-12-08 NOTE — PRE-PROCEDURE
GENERAL PRE-PROCEDURE:   Procedure:  Coronary angiogram with possible intervention  Date/Time:  12/8/2020 11:30 AM    Written consent obtained?: Yes    Risks and benefits: Risks, benefits and alternatives were discussed    Consent given by:  Patient  Patient states understanding of procedure being performed: Yes    Patient's understanding of procedure matches consent: Yes    Procedure consent matches procedure scheduled: Yes    Expected level of sedation:  Moderate  Appropriately NPO:  Yes  ASA Class:  Class 1- healthy patient  Mallampati  :  Grade 2- soft palate, base of uvula, tonsillar pillars, and portion of posterior pharyngeal wall visible  Lungs:  Lungs clear with good breath sounds bilaterally  Heart:  Normal heart sounds and rate  History & Physical reviewed:  History and physical reviewed and no updates needed  Statement of review:  I have reviewed the lab findings, diagnostic data, medications, and the plan for sedation    Case discussed with Dr. Gaitan of interventional cardiology. Given concerning symptoms it was determined this was an urgent case that did not require COVID testing result prior to performing procedure.

## 2020-12-09 DIAGNOSIS — E78.5 HYPERLIPIDEMIA LDL GOAL <100: Primary | ICD-10-CM

## 2020-12-09 DIAGNOSIS — Z13.6 CARDIOVASCULAR SCREENING; LDL GOAL LESS THAN 160: ICD-10-CM

## 2020-12-09 RX ORDER — ROSUVASTATIN CALCIUM 20 MG/1
10 TABLET, COATED ORAL DAILY
Qty: 90 TABLET | Refills: 1 | COMMUNITY
Start: 2020-12-09 | End: 2021-02-10

## 2020-12-09 NOTE — PROGRESS NOTES
Dr. López has reviewed patient's lipids.  Cholesterol = 227  Triglycerides = 87  HDL = 90  LDL = 120  Dr. López had started patient on Crestor 20 mg daily, but after seeing these results he would decrease dose to 10 mg daily.  Patient has been instructed to take 1/2 tablet daily.  Recheck lipids 3 months.  Order has been placed.

## 2020-12-10 ENCOUNTER — PRE VISIT (OUTPATIENT)
Dept: CARDIOLOGY | Facility: CLINIC | Age: 65
End: 2020-12-10

## 2020-12-10 NOTE — TELEPHONE ENCOUNTER
Faxed request for records update to PCP clinic (Ob/Gyn)    1300 received copy of October note from [facility]. Copy sent to scan    1310 received office note for Feb 2020 from [facility]. copy sent to scan

## 2020-12-11 LAB — INTERPRETATION ECG - MUSE: NORMAL

## 2020-12-16 ENCOUNTER — VIRTUAL VISIT (OUTPATIENT)
Dept: CARDIOLOGY | Facility: CLINIC | Age: 65
End: 2020-12-16
Payer: COMMERCIAL

## 2020-12-16 DIAGNOSIS — Z13.6 CARDIOVASCULAR SCREENING; LDL GOAL LESS THAN 160: ICD-10-CM

## 2020-12-16 DIAGNOSIS — I25.10 CORONARY ARTERY DISEASE INVOLVING NATIVE CORONARY ARTERY OF NATIVE HEART WITHOUT ANGINA PECTORIS: Primary | ICD-10-CM

## 2020-12-16 DIAGNOSIS — E78.5 HYPERLIPIDEMIA LDL GOAL <100: ICD-10-CM

## 2020-12-16 PROCEDURE — 99214 OFFICE O/P EST MOD 30 MIN: CPT | Mod: 95 | Performed by: PHYSICIAN ASSISTANT

## 2020-12-16 RX ORDER — ISOSORBIDE MONONITRATE 30 MG/1
15 TABLET, EXTENDED RELEASE ORAL DAILY
Qty: 45 TABLET | Refills: 3 | Status: SHIPPED | OUTPATIENT
Start: 2020-12-16 | End: 2020-12-16

## 2020-12-16 RX ORDER — ISOSORBIDE MONONITRATE 30 MG/1
30 TABLET, EXTENDED RELEASE ORAL DAILY
Qty: 1 TABLET | Refills: 3 | COMMUNITY
Start: 2020-12-16 | End: 2020-12-17

## 2020-12-16 RX ORDER — ROSUVASTATIN CALCIUM 10 MG/1
10 TABLET, COATED ORAL DAILY
Qty: 90 TABLET | Refills: 3 | Status: SHIPPED | OUTPATIENT
Start: 2020-12-16 | End: 2022-01-10

## 2020-12-16 NOTE — PROGRESS NOTES
"Dione Wilson is a 65 year old female who is being evaluated via a billable video visit.      The patient has been notified of following:     \"This video visit will be conducted via a call between you and your physician/provider. We have found that certain health care needs can be provided without the need for an in-person physical exam.  This service lets us provide the care you need with a video conversation.  If a prescription is necessary we can send it directly to your pharmacy.  If lab work is needed we can place an order for that and you can then stop by our lab to have the test done at a later time.    Video visits are billed at different rates depending on your insurance coverage.  Please reach out to your insurance provider with any questions.    If during the course of the call the physician/provider feels a video visit is not appropriate, you will not be charged for this service.\"    Patient has given verbal consent for Video visit? Yes  How would you like to obtain your AVS? Mail a copy  If you are dropped from the video visit, the video invite should be resent to:  Cellphone [phone]  Will anyone else be joining your video visit? No        Review Of Systems  Skin: NEGATIVE  Eyes:Ears/Nose/Throat: NEGATIVE  Respiratory: occ. sob  Cardiovascular:chest pain occ.  Gastrointestinal: NEGATIVE  Genitourinary:NEGATIVE   Musculoskeletal: NEGATIVE  Neurologic: NEGATIVE  Psychiatric: NEGATIVE  Hematologic/Lymphatic/Immunologic: NEGATIVE  Endocrine:  NEGATIVE    -----------------------------------------------------------------------------------------------------------------------------    Due to technical difficulties, this encounter is switched to a phone visit.     Primary Cardiologist: Dr. López    Reason for Phone Visit: Post angiogram follow up    HPI:  Dione is a pleasant 65-year-old female who was referred to Dr. López recently with tooth discomfort.  There was concern that this may be " representing progressive angina and therefore she was set up for invasive coronary angiogram.  This was completed on 12/8/2020.  This essentially demonstrated mild nonobstructive CAD.  She had mid LAD lesion that was about 25% and second diagonal lesion that was about 10%.  Left ventricular filling pressures were normal.  Per LV gram EF was preserved.  There was some thought that her symptoms may be related to microvascular disease and therefore she was started on Imdur 15 mg daily.  For primary prevention purposes she was also started on rosuvastatin which was later decreased to 10 mg daily.    I had a phone visit with Dione today.  She reports feeling much better since the addition of Imdur.  She no longer has the tooth pain that she was experiencing.  She takes her Imdur in the evening time when her symptoms seem to be most pronounced.  Overall she is happy with the way she feels.  She did have some headache on higher dose of rosuvastatin and she thinks she is tolerating lower dose better.  It is, however, difficult to know if her headaches were related to this.  She asked whether she should be taking aspirin.  She does not have strong family history of premature CAD.  She is normally a very active person and is planning to go for a walk today.    ROS:  12-pt ROS is negative except for as noted above.    A/P:   Dione is a very pleasant 65-year-old female with recent diagnosis of mild nonobstructive CAD as well as previous history of rheumatoid arthritis.  She is on rosuvastatin for primary prevention.  I think she would be a good candidate for baby aspirin daily.  She will start taking this.  We talked about importance of exercise.  We also talked about the possibility that her symptoms may have been from vasospasming of her coronary arteries.  Regardless of whether this was from coronary vasospasm or microvascular disease, Imdur is a very good medication.  We will continue with the current dose without any  new changes.  I asked if she is able to check her blood pressure at home but she does not have a blood pressure machine.  Historically her blood pressure has been well controlled.  We will follow-up with her in about 6 weeks with repeat fasting lipid panel/ALT. Dione reported that she may be starting methotrexate and/or DMARDs for rheumatoid arthritis.  I informed her that there are no contraindications for her to start such medications from a cardiac standpoint.    Phone duration time: 28 minutes       This visit is being conducted as a virtual visit due to the emphasis on mitigation of the COVID-19 virus pandemic. The clinician has decided that the risk of an in-office visit outweighs the benefit for this patient. The rest of the comprehensive physical examination is deferred due to public health emergency video visit restrictions.         Mariyln Espinosa PA-C   12/16/2020  Pager: [phone]

## 2020-12-16 NOTE — PROGRESS NOTES
Called patient to see how she was doing since her coronary angiogram.  She state she is having increase jaw discomfort which just started today.  She has not tried NTG.  I had her take a NTG and she did have relief.  Patient had only been taking 1/2 tablet of Imdur 30 mg, she mis understood her directions.  Reviewed with Dr. López.  He would like patient to take a full Imdur 30 mg tablet daily.  Continue to try NTG if jaw discomfort.  Went over when to go to ER if taken >3 NTG with no relief.  Patient verbalizes understanding.

## 2020-12-16 NOTE — PROGRESS NOTES
"Dione Wilson is a 65 year old female who is being evaluated via a billable video visit.      The patient has been notified of following:     \"This video visit will be conducted via a call between you and your physician/provider. We have found that certain health care needs can be provided without the need for an in-person physical exam.  This service lets us provide the care you need with a video conversation.  If a prescription is necessary we can send it directly to your pharmacy.  If lab work is needed we can place an order for that and you can then stop by our lab to have the test done at a later time.    Video visits are billed at different rates depending on your insurance coverage.  Please reach out to your insurance provider with any questions.    If during the course of the call the physician/provider feels a video visit is not appropriate, you will not be charged for this service.\"    Patient has given verbal consent for Video visit? Yes  How would you like to obtain your AVS? Mail a copy  If you are dropped from the video visit, the video invite should be resent to:  Cellphone [phone]  Will anyone else be joining your video visit? No        Video-Visit Details    Type of service:  Video Visit    Patient unable to assess vitals    Review Of Systems  Skin: NEGATIVE  Eyes:Ears/Nose/Throat: NEGATIVE  Respiratory: occ. sob  Cardiovascular:chest pain occ.  Gastrointestinal: NEGATIVE  Genitourinary:NEGATIVE   Musculoskeletal: NEGATIVE  Neurologic: NEGATIVE  Psychiatric: NEGATIVE  Hematologic/Lymphatic/Immunologic: NEGATIVE  Endocrine:  NEGATIVE          "

## 2020-12-16 NOTE — LETTER
"12/16/2020    Rachel Campos MD  [facility] [address]  [place] [address]    RE: Dione Wilson       Dear Colleague,    I had the pleasure of seeing Dione Wilson in the [facility].    Dione Wilson is a 65 year old female who is being evaluated via a billable video visit.      The patient has been notified of following:     \"This video visit will be conducted via a call between you and your physician/provider. We have found that certain health care needs can be provided without the need for an in-person physical exam.  This service lets us provide the care you need with a video conversation.  If a prescription is necessary we can send it directly to your pharmacy.  If lab work is needed we can place an order for that and you can then stop by our lab to have the test done at a later time.    Video visits are billed at different rates depending on your insurance coverage.  Please reach out to your insurance provider with any questions.    If during the course of the call the physician/provider feels a video visit is not appropriate, you will not be charged for this service.\"    Patient has given verbal consent for Video visit? Yes  How would you like to obtain your AVS? Mail a copy  If you are dropped from the video visit, the video invite should be resent to:  Cellphone [phone]  Will anyone else be joining your video visit? No        Video-Visit Details    Type of service:  Video Visit    Patient unable to assess vitals    Review Of Systems  Skin: NEGATIVE  Eyes:Ears/Nose/Throat: NEGATIVE  Respiratory: occ. sob  Cardiovascular:chest pain occ.  Gastrointestinal: NEGATIVE  Genitourinary:NEGATIVE   Musculoskeletal: NEGATIVE  Neurologic: NEGATIVE  Psychiatric: NEGATIVE  Hematologic/Lymphatic/Immunologic: NEGATIVE  Endocrine:  NEGATIVE            Dione Wilson is a 65 year old female who is being evaluated via a billable video " "visit.      The patient has been notified of following:     \"This video visit will be conducted via a call between you and your physician/provider. We have found that certain health care needs can be provided without the need for an in-person physical exam.  This service lets us provide the care you need with a video conversation.  If a prescription is necessary we can send it directly to your pharmacy.  If lab work is needed we can place an order for that and you can then stop by our lab to have the test done at a later time.    Video visits are billed at different rates depending on your insurance coverage.  Please reach out to your insurance provider with any questions.    If during the course of the call the physician/provider feels a video visit is not appropriate, you will not be charged for this service.\"    Patient has given verbal consent for Video visit? Yes  How would you like to obtain your AVS? Mail a copy  If you are dropped from the video visit, the video invite should be resent to:  Cellphone [phone]  Will anyone else be joining your video visit? No        Review Of Systems  Skin: NEGATIVE  Eyes:Ears/Nose/Throat: NEGATIVE  Respiratory: occ. sob  Cardiovascular:chest pain occ.  Gastrointestinal: NEGATIVE  Genitourinary:NEGATIVE   Musculoskeletal: NEGATIVE  Neurologic: NEGATIVE  Psychiatric: NEGATIVE  Hematologic/Lymphatic/Immunologic: NEGATIVE  Endocrine:  NEGATIVE    -----------------------------------------------------------------------------------------------------------------------------    Due to technical difficulties, this encounter is switched to a phone visit.     Primary Cardiologist: Dr. López    Reason for Phone Visit: Post angiogram follow up    HPI:  Dione is a pleasant 65-year-old female who was referred to Dr. López recently with tooth discomfort.  There was concern that this may be representing progressive angina and therefore she was set up for invasive coronary angiogram.  " This was completed on 12/8/2020.  This essentially demonstrated mild nonobstructive CAD.  She had mid LAD lesion that was about 25% and second diagonal lesion that was about 10%.  Left ventricular filling pressures were normal.  Per LV gram EF was preserved.  There was some thought that her symptoms may be related to microvascular disease and therefore she was started on Imdur 15 mg daily.  For primary prevention purposes she was also started on rosuvastatin which was later decreased to 10 mg daily.    I had a phone visit with Dione today.  She reports feeling much better since the addition of Imdur.  She no longer has the tooth pain that she was experiencing.  She takes her Imdur in the evening time when her symptoms seem to be most pronounced.  Overall she is happy with the way she feels.  She did have some headache on higher dose of rosuvastatin and she thinks she is tolerating lower dose better.  It is, however, difficult to know if her headaches were related to this.  She asked whether she should be taking aspirin.  She does not have strong family history of premature CAD.  She is normally a very active person and is planning to go for a walk today.    ROS:  12-pt ROS is negative except for as noted above.    A/P:   Dione is a very pleasant 65-year-old female with recent diagnosis of mild nonobstructive CAD as well as previous history of rheumatoid arthritis.  She is on rosuvastatin for primary prevention.  I think she would be a good candidate for baby aspirin daily.  She will start taking this.  We talked about importance of exercise.  We also talked about the possibility that her symptoms may have been from vasospasming of her coronary arteries.  Regardless of whether this was from coronary vasospasm or microvascular disease, Imdur is a very good medication.  We will continue with the current dose without any new changes.  I asked if she is able to check her blood pressure at home but she does not have  a blood pressure machine.  Historically her blood pressure has been well controlled.  We will follow-up with her in about 6 weeks with repeat fasting lipid panel/ALT. Dione reported that she may be starting methotrexate and/or DMARDs for rheumatoid arthritis.  I informed her that there are no contraindications for her to start such medications from a cardiac standpoint.    Phone duration time: 28 minutes       This visit is being conducted as a virtual visit due to the emphasis on mitigation of the COVID-19 virus pandemic. The clinician has decided that the risk of an in-office visit outweighs the benefit for this patient. The rest of the comprehensive physical examination is deferred due to public health emergency video visit restrictions.         Marilyn Espinosa PA-C   12/16/2020  Pager: [phone]          Thank you for allowing me to participate in the care of your patient.    Sincerely,     Marilyn Espinosa PA-C     [facility]

## 2020-12-17 DIAGNOSIS — I25.10 CORONARY ARTERY DISEASE INVOLVING NATIVE CORONARY ARTERY OF NATIVE HEART WITHOUT ANGINA PECTORIS: ICD-10-CM

## 2020-12-17 RX ORDER — ISOSORBIDE MONONITRATE 30 MG/1
30 TABLET, EXTENDED RELEASE ORAL DAILY
Qty: 90 TABLET | Refills: 3 | Status: SHIPPED | OUTPATIENT
Start: 2020-12-17 | End: 2021-03-16

## 2021-01-05 DIAGNOSIS — I25.10 CORONARY ARTERY DISEASE INVOLVING NATIVE CORONARY ARTERY OF NATIVE HEART WITHOUT ANGINA PECTORIS: Primary | ICD-10-CM

## 2021-01-08 DIAGNOSIS — I25.10 CORONARY ARTERY DISEASE INVOLVING NATIVE CORONARY ARTERY OF NATIVE HEART WITHOUT ANGINA PECTORIS: ICD-10-CM

## 2021-01-08 LAB
ALT SERPL W P-5'-P-CCNC: 39 U/L (ref 0–50)
CHOLEST SERPL-MCNC: 158 MG/DL
HDLC SERPL-MCNC: 99 MG/DL
LDLC SERPL CALC-MCNC: 51 MG/DL
NONHDLC SERPL-MCNC: 59 MG/DL
TRIGL SERPL-MCNC: 42 MG/DL

## 2021-01-08 PROCEDURE — 36415 COLL VENOUS BLD VENIPUNCTURE: CPT | Performed by: PHYSICIAN ASSISTANT

## 2021-01-08 PROCEDURE — 84460 ALANINE AMINO (ALT) (SGPT): CPT | Performed by: PHYSICIAN ASSISTANT

## 2021-01-08 PROCEDURE — 80061 LIPID PANEL: CPT | Performed by: PHYSICIAN ASSISTANT

## 2021-01-14 ENCOUNTER — TRANSFERRED RECORDS (OUTPATIENT)
Dept: HEALTH INFORMATION MANAGEMENT | Facility: CLINIC | Age: 66
End: 2021-01-14

## 2021-01-14 LAB
ALT SERPL-CCNC: 19 IU/L (ref 0–32)
AST SERPL-CCNC: 20 IU/L (ref 0–40)
CREAT SERPL-MCNC: 0.54 MG/DL (ref 0.57–1)
GFR SERPL CREATININE-BSD FRML MDRD: 100 ML/MIN/1.73
GLUCOSE SERPL-MCNC: 96 MG/DL (ref 65–99)
POTASSIUM SERPL-SCNC: 4.2 MMOL/L (ref 3.5–5.2)
TSH SERPL-ACNC: 1.86 UIU/ML (ref 0.45–4.5)

## 2021-01-22 ENCOUNTER — TRANSFERRED RECORDS (OUTPATIENT)
Dept: HEALTH INFORMATION MANAGEMENT | Facility: CLINIC | Age: 66
End: 2021-01-22

## 2021-01-25 ENCOUNTER — TRANSFERRED RECORDS (OUTPATIENT)
Dept: HEALTH INFORMATION MANAGEMENT | Facility: CLINIC | Age: 66
End: 2021-01-25

## 2021-01-26 ENCOUNTER — TRANSFERRED RECORDS (OUTPATIENT)
Dept: HEALTH INFORMATION MANAGEMENT | Facility: CLINIC | Age: 66
End: 2021-01-26

## 2021-01-28 ENCOUNTER — MYC MEDICAL ADVICE (OUTPATIENT)
Dept: CARDIOLOGY | Facility: CLINIC | Age: 66
End: 2021-01-28

## 2021-01-28 ENCOUNTER — TELEPHONE (OUTPATIENT)
Dept: CARDIOLOGY | Facility: CLINIC | Age: 66
End: 2021-01-28

## 2021-01-28 NOTE — TELEPHONE ENCOUNTER
"Marilyn visit 2-10-21.   My Chart message 1-28-21 -   Bull Ms. Espinosa:     I am writing to give a heads up to some recent procedures and medications as well as to see (plead really)  b/c of my immunosuppressive RA, to be put higher in the priority list for a COVID vaccine.  I would also be willing to be called at a moments notice to receive a vaccine if there is left over vaccine somewhere.   You may not be the correct provider, but I thought I would start with you since I have an upcoming appointment and [facility] seems to be a leader in the COVID distribution and I received an email suggesting updates be made.      Since my jaw/tooth pain has been continuing and quite debilitating, my  , Dr. Rufino Wilson had me visit Dr. Boyce, of [facility] who recommended a head MRI.  Fortunately, my sinuses and brain study came back \"normal\" for my age.  After a series of blood tests, Dr. Boyce did note that I had a deficiency of B12 and D3.  I am now on those supplements, in additional to Calcium Citrate daily.       These supplements are in addition to my daily Estradiol, (.5 mg); Rosuvastatin, (10 mg) 1 low dose aspirin, isosorbide, (30 mg), hydroxychloroquine, (300 mg) Sulfaszlazine (2 tablets twice daily; 500 MG - not sure if that is the total).  This week Dr. Stack, my Rheumatologist, has prescribed  Methotrexate, Four 2.5 mg tablets (once a week) accompanied by a daily dose of Folic Acid 1 mg since my jaw and tooth pain and general RA seems to be progressing.   Prior to last year, my only medication was the .5mg of Estradiol and occasional extra strength Tylenol.     I hope after a time on Methotrexate that my jaw and tooth pain will smith, thereby eliminating at least the isosorbide since I understand I only had one 25% LAD lesion which I also understand Dr. López feels is not enough to cause symptoms.  Since my HDL and LDL cholesterol numbers have come back so well on " Rosuvastatin, I will defer to you and Dr. López to determine if it is still recommended for me.      Thank you and I look forward to our virtual visit in February.     Dione Wilson  ______________________________________________  Message replied to Patient is that [place] State governs what age brackets are to receive the covid vaccine.   Please look at [place] . Gov. Health web site for further information. Or possibly contact your Primary Care Physician.

## 2021-02-01 NOTE — TELEPHONE ENCOUNTER
Marilyn Espinosa PA-C Theis, Marcie J, RN 11 minutes ago (1:29 PM)     I agree with the info you sent Louise. I unfortunately do not have much control over vaccine distribution. I know it has been a frustrating process for all. I hope Gladys is able to get it soon!     Marilyn

## 2021-02-10 ENCOUNTER — VIRTUAL VISIT (OUTPATIENT)
Dept: CARDIOLOGY | Facility: CLINIC | Age: 66
End: 2021-02-10
Attending: PHYSICIAN ASSISTANT
Payer: COMMERCIAL

## 2021-02-10 DIAGNOSIS — I25.119 CORONARY ARTERY DISEASE INVOLVING NATIVE CORONARY ARTERY OF NATIVE HEART WITH ANGINA PECTORIS (H): Primary | ICD-10-CM

## 2021-02-10 DIAGNOSIS — E78.5 HYPERLIPIDEMIA LDL GOAL <100: ICD-10-CM

## 2021-02-10 PROCEDURE — 99214 OFFICE O/P EST MOD 30 MIN: CPT | Mod: 95 | Performed by: PHYSICIAN ASSISTANT

## 2021-02-10 RX ORDER — HYDROXYCHLOROQUINE SULFATE 200 MG/1
200 TABLET, FILM COATED ORAL EVERY MORNING
COMMUNITY
Start: 2021-02-10

## 2021-02-10 RX ORDER — METHOTREXATE SODIUM 2.5 MG/1
10 TABLET ORAL WEEKLY
COMMUNITY
End: 2021-07-29

## 2021-02-10 RX ORDER — ASPIRIN 81 MG/1
81 TABLET ORAL DAILY
Status: ON HOLD | COMMUNITY
End: 2024-08-07

## 2021-02-10 RX ORDER — FOLIC ACID 1 MG/1
2 TABLET ORAL 2 TIMES DAILY
COMMUNITY

## 2021-02-10 NOTE — PROGRESS NOTES
"Dione is a 65 year old who is being evaluated via a billable video visit.      How would you like to obtain your AVS? MyChart  If the video visit is dropped, the invitation should be resent by: Text to cell phone: [phone]  Will anyone else be joining your video visit? No      Vitals - Patient Reported 2/10/2021   Height (Patient Reported) 5' 1.5\"   Weight (Patient Reported) 125 lb   BMI (Based on Pt Reported Ht/Wt) 23.24 kg/m2       Review Of Systems  Skin: NEGATIVE  Eyes:Ears/Nose/Throat: Readers only  Respiratory: Negative  Cardiovascular:NEGATIVE  Gastrointestinal: NEGATIVE  Genitourinary:NEGATIVE   Musculoskeletal: RA  Neurologic: NEGATIVE  Psychiatric: NEGATIVE  Hematologic/Lymphatic/Immunologic: NEGATIVE  Endocrine:  NEGATIVE    Kajal Rothman [facility]    -----------------------------------------------------------------------------------------------------------------------------    Primary Cardiologist: Dr. López    Reason for Video Visit: Follow up to review Lipid panel and ALT     HPI:  Dione is a very pleasant 65-year-old female with past medical history notable for mild nonobstructive CAD and rheumatoid arthritis.    She reports doing well but continues to have intermittent episodes of jaw discomfort. She was seen by neurology and had MRI imaging which showed no concerning findings. It was felt that she may have TMJ arthritis and this may be causing some of the discomfort. She was started on methotrexate 2 weeks ago and she feels this already helped some with the discomfort. She also thinks having poor posture at work and resting her chin on her hand may have led to some of this discomfort too.     ROS:  12-pt ROS is negative except for as noted above.     Physical Exam:  A limited exam was conducted via video.  Constitutional:  Patient is pleasant, alert, cooperative, and in NAD.  HEENT:  NCAT. EOM's intact.   Neck:  CVP appears normal.   Pulmonary: Normal respiratory effort.   Extremities: No " edema, erythema, cyanosis appreciated.  Skin:  No rashes or lesions appreciated.   Neurological:  No gross motor or sensory deficits.   Psych: Appropriate affect.       A/P:   Dione is a very pleasant 65-year-old female with past medical history notable for mild nonobstructive CAD and rheumatoid arthritis.    We talked about increasing imdur to 60 mg or considering alternative medications such as amlodipine in the case that her discomfort could be from coronary vasospasm. She would like to continue with her RA therapies at this time to see if this would help further. We decided to touch base in 3 months.     Her lipid panel shows excellent control of her numbers. She will continue rosuvastatin 10 mg.     Video start time: 8:45 AM  Video end time: 9:16 AM  Originating Location (pt. Location): home  Distant Location (provider location):  [facility]   Mode of Communication:  Video Conference via [employer] phone application       This visit is being conducted as a virtual visit due to the emphasis on mitigation of the COVID-19 virus pandemic. The clinician has decided that the risk of an in-office visit outweighs the benefit for this patient. The rest of the comprehensive physical examination is deferred due to public health emergency video visit restrictions.         Marilyn Espinosa PA-C   2/10/2021  Pager: [phone]

## 2021-02-10 NOTE — LETTER
"2/10/2021    Rachel Campos MD  [facility] [address]  [facility] [id number]    RE: Dione KEMP Steve       Dear Colleague,    I had the pleasure of seeing Dione Wilson in the [facility].    Dione is a 65 year old who is being evaluated via a billable video visit.      How would you like to obtain your AVS? MyChart  If the video visit is dropped, the invitation should be resent by: Text to cell phone: [phone]  Will anyone else be joining your video visit? No      Vitals - Patient Reported 2/10/2021   Height (Patient Reported) 5' 1.5\"   Weight (Patient Reported) 125 lb   BMI (Based on Pt Reported Ht/Wt) 23.24 kg/m2       Review Of Systems  Skin: NEGATIVE  Eyes:Ears/Nose/Throat: Readers only  Respiratory: Negative  Cardiovascular:NEGATIVE  Gastrointestinal: NEGATIVE  Genitourinary:NEGATIVE   Musculoskeletal: RA  Neurologic: NEGATIVE  Psychiatric: NEGATIVE  Hematologic/Lymphatic/Immunologic: NEGATIVE  Endocrine:  NEGATIVE    Kajal Rothman [facility]    -----------------------------------------------------------------------------------------------------------------------------    Primary Cardiologist: Dr. López    Reason for Video Visit: Follow up to review Lipid panel and ALT     HPI:  Dione is a very pleasant 65-year-old female with past medical history notable for mild nonobstructive CAD and rheumatoid arthritis.    She reports doing well but continues to have intermittent episodes of jaw discomfort. She was seen by neurology and had MRI imaging which showed no concerning findings. It was felt that she may have TMJ arthritis and this may be causing some of the discomfort. She was started on methotrexate 2 weeks ago and she feels this already helped some with the discomfort. She also thinks having poor posture at work and resting her chin on her hand may have led to some of this discomfort too.     ROS:  12-pt ROS " is negative except for as noted above.     Physical Exam:  A limited exam was conducted via video.  Constitutional:  Patient is pleasant, alert, cooperative, and in NAD.  HEENT:  NCAT. EOM's intact.   Neck:  CVP appears normal.   Pulmonary: Normal respiratory effort.   Extremities: No edema, erythema, cyanosis appreciated.  Skin:  No rashes or lesions appreciated.   Neurological:  No gross motor or sensory deficits.   Psych: Appropriate affect.       A/P:   Dione is a very pleasant 65-year-old female with past medical history notable for mild nonobstructive CAD and rheumatoid arthritis.    We talked about increasing imdur to 60 mg or considering alternative medications such as amlodipine in the case that her discomfort could be from coronary vasospasm. She would like to continue with her RA therapies at this time to see if this would help further. We decided to touch base in 3 months.     Her lipid panel shows excellent control of her numbers. She will continue rosuvastatin 10 mg.     Video start time: 8:45 AM  Video end time: 9:16 AM  Originating Location (pt. Location): home  Distant Location (provider location):  [facility]   Mode of Communication:  Video Conference via [employer] phone application       This visit is being conducted as a virtual visit due to the emphasis on mitigation of the COVID-19 virus pandemic. The clinician has decided that the risk of an in-office visit outweighs the benefit for this patient. The rest of the comprehensive physical examination is deferred due to public health emergency video visit restrictions.         Marilyn Espinosa PA-C   2/10/2021  Pager: [phone]        Thank you for allowing me to participate in the care of your patient.    Sincerely,     Marilyn Espinosa PA-C     [facility]

## 2021-03-16 ENCOUNTER — OFFICE VISIT (OUTPATIENT)
Dept: FAMILY MEDICINE | Facility: CLINIC | Age: 66
End: 2021-03-16
Payer: COMMERCIAL

## 2021-03-16 VITALS
WEIGHT: 131 LBS | OXYGEN SATURATION: 98 % | HEIGHT: 61 IN | TEMPERATURE: 97.3 F | BODY MASS INDEX: 24.73 KG/M2 | SYSTOLIC BLOOD PRESSURE: 125 MMHG | DIASTOLIC BLOOD PRESSURE: 80 MMHG | HEART RATE: 73 BPM

## 2021-03-16 DIAGNOSIS — E53.8 VITAMIN B12 DEFICIENCY (NON ANEMIC): ICD-10-CM

## 2021-03-16 DIAGNOSIS — B00.1 RECURRENT COLD SORES: ICD-10-CM

## 2021-03-16 DIAGNOSIS — M06.9 RHEUMATOID ARTHRITIS, RHEUMATOID FACTOR STATUS UNKNOWN (H): ICD-10-CM

## 2021-03-16 DIAGNOSIS — E55.9 VITAMIN D DEFICIENCY: ICD-10-CM

## 2021-03-16 DIAGNOSIS — M26.609 TMJ (TEMPOROMANDIBULAR JOINT SYNDROME): ICD-10-CM

## 2021-03-16 DIAGNOSIS — D64.9 ANEMIA, UNSPECIFIED TYPE: ICD-10-CM

## 2021-03-16 DIAGNOSIS — R51.9 DAILY HEADACHE: Primary | ICD-10-CM

## 2021-03-16 DIAGNOSIS — I25.10 CORONARY ARTERY DISEASE INVOLVING NATIVE CORONARY ARTERY OF NATIVE HEART WITHOUT ANGINA PECTORIS: ICD-10-CM

## 2021-03-16 DIAGNOSIS — M26.623 BILATERAL TEMPOROMANDIBULAR JOINT PAIN: ICD-10-CM

## 2021-03-16 LAB
CRP SERPL-MCNC: <2.9 MG/L (ref 0–8)
ERYTHROCYTE [DISTWIDTH] IN BLOOD BY AUTOMATED COUNT: 14.6 % (ref 10–15)
ERYTHROCYTE [SEDIMENTATION RATE] IN BLOOD BY WESTERGREN METHOD: 5 MM/H (ref 0–30)
HCT VFR BLD AUTO: 35.2 % (ref 35–47)
HGB BLD-MCNC: 11.5 G/DL (ref 11.7–15.7)
MCH RBC QN AUTO: 31.9 PG (ref 26.5–33)
MCHC RBC AUTO-ENTMCNC: 32.7 G/DL (ref 31.5–36.5)
MCV RBC AUTO: 98 FL (ref 78–100)
PLATELET # BLD AUTO: 322 10E9/L (ref 150–450)
RBC # BLD AUTO: 3.61 10E12/L (ref 3.8–5.2)
VIT B12 SERPL-MCNC: 626 PG/ML (ref 193–986)
WBC # BLD AUTO: 5.7 10E9/L (ref 4–11)

## 2021-03-16 PROCEDURE — 85652 RBC SED RATE AUTOMATED: CPT | Performed by: INTERNAL MEDICINE

## 2021-03-16 PROCEDURE — 99204 OFFICE O/P NEW MOD 45 MIN: CPT | Performed by: INTERNAL MEDICINE

## 2021-03-16 PROCEDURE — 86140 C-REACTIVE PROTEIN: CPT | Performed by: INTERNAL MEDICINE

## 2021-03-16 PROCEDURE — 83550 IRON BINDING TEST: CPT | Performed by: INTERNAL MEDICINE

## 2021-03-16 PROCEDURE — 85027 COMPLETE CBC AUTOMATED: CPT | Performed by: INTERNAL MEDICINE

## 2021-03-16 PROCEDURE — 82607 VITAMIN B-12: CPT | Performed by: INTERNAL MEDICINE

## 2021-03-16 PROCEDURE — 36415 COLL VENOUS BLD VENIPUNCTURE: CPT | Performed by: INTERNAL MEDICINE

## 2021-03-16 PROCEDURE — 82306 VITAMIN D 25 HYDROXY: CPT | Performed by: INTERNAL MEDICINE

## 2021-03-16 PROCEDURE — 80053 COMPREHEN METABOLIC PANEL: CPT | Performed by: INTERNAL MEDICINE

## 2021-03-16 PROCEDURE — 83540 ASSAY OF IRON: CPT | Performed by: INTERNAL MEDICINE

## 2021-03-16 ASSESSMENT — MIFFLIN-ST. JEOR: SCORE: 1084.76

## 2021-03-16 NOTE — PROGRESS NOTES
Assessment & Plan     Daily headache  Patient gives a very nice but complex history  She has been dealing with this headache since summer which is worse since October  She does not have any fever or chills or night sweats or weight loss  She has seen rheumatology and I actually talked to her rheumatologist Dr. Mansfield today  She has seen neurologist and I have also called them and reviewed their notes  I do not think she has giant cell arteritis  We will check ultrasound of the temporal arteries because of the jaw pain  We will also check the labs  I think the pain is from TM joint issues in her orthopedics surgeon  thinks that this may be rheumatoid arthritis in the TM joint  I have made her rheumatologist aware  And she thinks that she might need biologic agents  We will stop HRT for now  - CRP, inflammation  - Erythrocyte sedimentation rate auto  - US Temporal Arteries Duplex  - [facility]    Rheumatoid arthritis, rheumatoid factor status unknown (H)  Patient is on methotrexate and Plaquenil and off subsided since last week  She still has active synovitis in her hands and might need biologic agents  She is following up with eye physicians  - CRP, inflammation  - Erythrocyte sedimentation rate auto  - Comprehensive metabolic panel  - CBC with platelets    Recurrent cold sores  She takes acyclovir but might be easier to just give valacyclovir    Coronary artery disease involving native coronary artery of native heart without angina pectoris  Patient has 30% LAD lesion and nitrates have been stopped  If she has recurrent angina we will use amlodipine or L-arginine and continue statins and aspirin    Bilateral temporomandibular joint pain  As above this is a major issue and be quite certain that this is not giant cell arteritis and TM joint issue  I will arrange physical therapy    Vitamin B12 deficiency (non anemic)  She has been on sublingual and we will check a level  - Vitamin B12    Vitamin  D deficiency  She is on 5000 units vitamin D  - Vitamin D Deficiency    TMJ (temporomandibular joint syndrome)  As above  - [facility]    Anemia, unspecified type  This was found today on testing and we will work-up further  - **Iron and iron binding capacity FUTURE anytime      Review of prior external note(s) from - Outside records from Rheumatology and neurology  60 minutes spent on the date of the encounter doing chart review, history and exam, documentation and further activities as noted above           Return for lab and US today.    [facility]ani Callahan MD  [facility]    [facility]   Dione is a 65 year old who presents for the following health issues     HPI       New Patient/Transfer of Care  Chief Complaint   Patient presents with     [facility] Care     Headache   This is a very nice 65 years old   Last year she was diagnosed to have rheumatoid arthritis by her  who is an orthopedic surgeon  She had hand stiffness at that time with pain in the hands  She has been on methotrexate since this year but was on Plaquenil and sulfasalazine  Last summer she started to have jaw pain  This was accompanied by her bilateral disabling headache and jaw pain without any constitutional symptoms  Sometimes this was exertional and she also had coronary angiogram done in October which showed LAD lesion which was about 30%  Pain has persisted  She does not have angina except for extreme exertion with waterskiing etc.  She stopped nitrates last week and feels better in regards to headache but jaw pain came back Sunday  Sulfasalazine also stopped last week  Patient has seen neurology and has done MRI brain  All work up has been Negative for ESR, CRP elevation also  She has normal MRI brain too.    Past Medical History:   Diagnosis Date     Coronary artery disease involving native coronary artery of native heart without angina pectoris     cardiac cath 12/8/2020: mild non-obstructive  "disease     Daily headache      Recurrent cold sores      Rheumatoid arthritis, rheumatoid factor status unknown (H)      Past Surgical History:   Procedure Laterality Date     COLONOSCOPY       COLONOSCOPY N/A 12/6/2016    Procedure: COLONOSCOPY;  Surgeon: Philip Santiago MD;  Location:  GI     CV LEFT HEART CATH N/A 12/8/2020    Procedure: Left Heart Cath;  Surgeon: Aleksander López MD;  Location:  HEART CARDIAC CATH LAB     CV LEFT VENTRICULOGRAM N/A 12/8/2020    Procedure: Left Ventriculogram;  Surgeon: Aleksander López MD;  Location:  HEART CARDIAC CATH LAB     ENT SURGERY      T&A     GYN SURGERY      vag hyst     Family History   Problem Relation Age of Onset     Diabetes Father      Cancer Father      Peptic Ulcer Disease Mother      Crohn's Disease Brother      Coronary Artery Disease Brother 60         Review of Systems   10 point ROS of systems including Constitutional, Eyes, Respiratory, Cardiovascular, Gastroenterology, Genitourinary, Integumentary, Muscularskeletal, Psychiatric were all negative except for pertinent positives noted in my HPI.        Objective    /80 (BP Location: Left arm, Patient Position: Sitting, Cuff Size: Adult Regular)   Pulse 73   Temp 97.3  F (36.3  C) (Temporal)   Ht 1.554 m (5' 1.2\")   Wt 59.4 kg (131 lb)   SpO2 98%   BMI 24.59 kg/m    Body mass index is 24.59 kg/m .  Physical Exam   GENERAL: healthy, alert and no distress  NECK: no adenopathy, no asymmetry, masses, or scars and thyroid normal to palpation  RESP: lungs clear to auscultation - no rales, rhonchi or wheezes  CV: regular rate and rhythm, normal S1 S2, no S3 or S4, no murmur, click or rub, no peripheral edema and peripheral pulses strong  ABDOMEN: soft, nontender, no hepatosplenomegaly, no masses and bowel sounds normal  MS: bilateral TMJ tenderness  Swelling of left TMJ and dislocation   SKIN: no suspicious lesions or rashes  NEURO: Normal strength and tone, mentation intact " and speech normal        Current Outpatient Medications   Medication     aspirin 81 MG EC tablet     Calcium 250 MG CAPS     cholecalciferol (VITAMIN D3) 25 mcg (1000 units) capsule     Cyanocobalamin (VITAMIN B 12) 100 MCG LOZG     estradiol (ESTRACE) 1 MG tablet     folic acid (FOLVITE) 1 MG tablet     hydroxychloroquine (PLAQUENIL) 200 MG tablet     methotrexate 2.5 MG tablet     nitroGLYcerin (NITROSTAT) 0.4 MG sublingual tablet     rosuvastatin (CRESTOR) 10 MG tablet     sulfaSALAzine (AZULFIDINE) 500 MG tablet     No current facility-administered medications for this visit.    \

## 2021-03-16 NOTE — Clinical Note
Bull Pleitez,  This patient would really use your help in sorting out the jaw pain for which she has not had multiple doors.  Her  is an excellent orthopedic surgeon and they have seen ophthalmology, neurology, rheumatology.  I do not think this is giant cell arteritis.  And it could be a local problem in the joints but she is in a lot of pain..  I told her about you and she is looking forward to an appointment with you.  Thank you

## 2021-03-17 ENCOUNTER — HOSPITAL ENCOUNTER (OUTPATIENT)
Dept: ULTRASOUND IMAGING | Facility: CLINIC | Age: 66
Discharge: HOME | End: 2021-03-17
Attending: INTERNAL MEDICINE | Admitting: INTERNAL MEDICINE
Payer: COMMERCIAL

## 2021-03-17 DIAGNOSIS — R51.9 DAILY HEADACHE: ICD-10-CM

## 2021-03-17 LAB
ALBUMIN SERPL-MCNC: 4.5 G/DL (ref 3.4–5)
ALP SERPL-CCNC: 70 U/L (ref 40–150)
ALT SERPL W P-5'-P-CCNC: 37 U/L (ref 0–50)
ANION GAP SERPL CALCULATED.3IONS-SCNC: 4 MMOL/L (ref 3–14)
AST SERPL W P-5'-P-CCNC: 27 U/L (ref 0–45)
BILIRUB SERPL-MCNC: 0.6 MG/DL (ref 0.2–1.3)
BUN SERPL-MCNC: 14 MG/DL (ref 7–30)
CALCIUM SERPL-MCNC: 8.7 MG/DL (ref 8.5–10.1)
CHLORIDE SERPL-SCNC: 107 MMOL/L (ref 94–109)
CO2 SERPL-SCNC: 27 MMOL/L (ref 20–32)
CREAT SERPL-MCNC: 0.64 MG/DL (ref 0.52–1.04)
DEPRECATED CALCIDIOL+CALCIFEROL SERPL-MC: 51 UG/L (ref 20–75)
GFR SERPL CREATININE-BSD FRML MDRD: >90 ML/MIN/{1.73_M2}
GLUCOSE SERPL-MCNC: 95 MG/DL (ref 70–99)
IRON SATN MFR SERPL: 26 % (ref 15–46)
IRON SERPL-MCNC: 73 UG/DL (ref 35–180)
POTASSIUM SERPL-SCNC: 4 MMOL/L (ref 3.4–5.3)
PROT SERPL-MCNC: 7.4 G/DL (ref 6.8–8.8)
SODIUM SERPL-SCNC: 138 MMOL/L (ref 133–144)
TIBC SERPL-MCNC: 279 UG/DL (ref 240–430)

## 2021-03-17 PROCEDURE — 93882 EXTRACRANIAL UNI/LTD STUDY: CPT

## 2021-03-18 ENCOUNTER — TELEPHONE (OUTPATIENT)
Dept: FAMILY MEDICINE | Facility: CLINIC | Age: 66
End: 2021-03-18

## 2021-03-23 ENCOUNTER — TRANSFERRED RECORDS (OUTPATIENT)
Dept: HEALTH INFORMATION MANAGEMENT | Facility: CLINIC | Age: 66
End: 2021-03-23

## 2021-03-25 ENCOUNTER — TRANSFERRED RECORDS (OUTPATIENT)
Dept: HEALTH INFORMATION MANAGEMENT | Facility: CLINIC | Age: 66
End: 2021-03-25

## 2021-04-28 ENCOUNTER — PRE VISIT (OUTPATIENT)
Dept: NEUROLOGY | Facility: CLINIC | Age: 66
End: 2021-04-28

## 2021-04-28 NOTE — TELEPHONE ENCOUNTER
FUTURE VISIT INFORMATION      FUTURE VISIT INFORMATION:    Date: 5/5/2021    Time: 11am    [place] [facility]  REFERRAL INFORMATION:    Referring provider:  Dr. Callahan    Referring providers clinic:  [facility]     Reason for visit/diagnosis  Headaches     RECORDS REQUESTED FROM:       Clinic name Comments Records Status Imaging Status   Internal Dr. Callahan-3/16/2021 Epic N/A          [facility] MRI Brain-1/22/2021 Scanned to Chart Requested to PACS                       4/28/2021-Request for IMages faxed to [facility]-MR @ 234pm    5/4/2021-Images from [facility] now in PACS-MR @ 643am

## 2021-05-05 ENCOUNTER — VIRTUAL VISIT (OUTPATIENT)
Dept: NEUROLOGY | Facility: CLINIC | Age: 66
End: 2021-05-05
Attending: INTERNAL MEDICINE
Payer: COMMERCIAL

## 2021-05-05 DIAGNOSIS — M06.9 RHEUMATOID ARTHRITIS, RHEUMATOID FACTOR STATUS UNKNOWN (H): ICD-10-CM

## 2021-05-05 DIAGNOSIS — R68.84 JAW PAIN: Primary | ICD-10-CM

## 2021-05-05 PROCEDURE — 99204 OFFICE O/P NEW MOD 45 MIN: CPT | Mod: 95 | Performed by: PSYCHIATRY & NEUROLOGY

## 2021-05-05 NOTE — LETTER
5/5/2021       RE: Dione Wilson  [address]  [place] [address]     Dear Colleague,    Thank you for referring your patient, Dione Wilson, to the [facility] [place] at [facility]. Please see a copy of my visit note below.    Dione is a 65 year old who is being evaluated via a billable video visit.      How would you like to obtain your AVS? MyChart  If the video visit is dropped, the invitation should be resent by: Text to cell phone: [phone]  Will anyone else be joining your video visit? No    Video-Visit Details    Type of service:  Video Visit    Originating Location (pt. Location): Home    Distant Location (provider location):  [facility] [place]     Platform used for Video Visit: Casabi     [facility]  Virtual Neurology Consult     Dione Wilson MRN# [id number]   YOB: 1955 Age: 65 year old     Requesting physician: Rani Callahan MD         Assessment and Recommendations:     Dione Wilson is a 65 year old female with past medical history including seronegative rheumatoid arthritis, possible TMJ arthritis, coronary artery disease who presented in consultation for headache.    # Chronic daily headaches  # Jaw pain  Patient with new onset headaches starting fall 2020.  Headaches started after development of maxillary pain summer 2020.  Bitemporal headaches are a daily occurrence lately.  No clear migrainous features, patient denies photophobia, phonophobia, only with occasional nausea.  Patient denies clear autonomic features.  Recent stressors include the upcoming wedding of her son and lifestyle changes associated with COVID-19 pandemic.  Patient is at risk of medication overuse headache given report of taking 2 Tylenols and 2 Advils scheduled 3 times daily.  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  Prior evaluation included negative laboratory inflammatory markers, negative brain MRI, normal left temporal artery ultrasound, reportedly unremarkable slit-lamp eye evaluation.  In light of prior diagnosis of rheumatoid arthritis, patient is certainly at risk of temporal arteritis, though less likely given overall unremarkable evaluation to date.  Given extensive prior evaluation, would not add on any additional evaluation at this point.   There is a possibility that there is overlying facial dystonia given physical exam with noted tenderness upon raising eyebrows, upon moving tongue, and with neck motions.    Given daily nature of headache, patient would benefit from a headache preventative measure.  Discussed medication options for alleviating bitemporal pain at today's appointment.  This included topiramate with goal dose 100 mg daily or gabapentin.  Patient declined after discussing side effects of these medications.  Discussed potential use of tricyclic antidepressants in the future.  Discussed possibility of Botox for migraine headache.  In addition, patient may benefit from PM&R evaluation for possible Botox use given prominent musculoskeletal features on exam.  -Patient reported improvement in headache after discontinuation of sulfasalazine.  From a headache review, agree with Humira in place of sulfasalazine for rheumatoid arthritis.  -PM&R referral placed for evaluation by Dr. Gomez  -Patient is at risk of medication withdrawal headache due to current medications including 2 Advil and 2 Tylenol 3 times daily.      -Patient would like to consolidate care within [facility].  Placed rheumatology referral for [facility]ologist.  Will defer further temporal arteritis workup rheumatology service, could consider arterial imaging versus temporal artery biopsy.    Patient was seen and discussed with attending physician,   Maik.    Coni Tobar MD  PGY-3  Neurology    Physician Attestation   I, Lisa Pleitez MD, saw this patient and agree with the findings and plan of care as documented in the note.      I spent 57 minutes on patient care, coordination of care, and documentation on the day of the visit.    Lisa Pleitez MD              Chief Complaint:     Chief Complaint   Patient presents with     Consult     VIDEO VISIT NEW           History is obtained from the patient and medical record.  Patient was seen via a virtual visit in their home due to the Covid 19 global pandemic.      Dione Wilson is a 65 year old female with past medical history including seronegative rheumatoid arthritis, possible TMJ arthritis, and coronary artery disease who presents in consultation for headache.    Occasional headache in the past prior to 2020.  These headaches typically resolved with Tylenol.    Patient reports she was diagnosed with seronegative rheumatoid arthritis followed 2019.  In the summer 2020, she developed jaw pain more notable on the left relative to right.  In fall 2020, she developed bitemporal headaches.  Headaches were initially intermittent but are now a constant presence.    Headaches are described as present in both temples.  Has constant dull pain.  This pain turns sharp when there is additional tooth pain associated with jaw pain.  Headaches are typically 3-4 out of 10 in intensity at their worst.  Denies associated photophobia, denies associated phonophobia, has occasional nausea.  Denies lacrimation, occasionally has runny nose as she is outdoors often.  Laying down helps distract her away from the pain.  She finds her self laying down about 3 to 4 days/week and this interrupts her daily activities.    Takes Tylenol and Advil TID for the past 2 months, which is helpful.  Whenever she forgets her lunchtime dose, her headache intensity increases.  She has not tried any prior headache preventative measure.   Denies prior history of beta-blocker use, tricyclic use, or calcium channel blocker use.  Headache intensity improved after she discontinued sulfasalazine (previously prescribed for rheumatoid arthritis).  She also reports improvement after starting methotrexate for arthritis.  While she was on prednisone therapy for rheumatoid arthritis in the fall 2020, she reported improvement in headache.  She prefers not to start additional systemic medications if at all possible.    Initially denied specific stressors.  However upon further questioning, reported that she does have an upcoming son's wedding.  She reported overall changes in lifestyle due to COVID-19 pandemic.    Prior work-up included unremarkable temporal artery ultrasound, jaw imaging generally unremarkable.  She also underwent MRI brain at [facility] neurolog[facility] which was unremarkable.  She had a slit-lamp ophthalmology examination which was reportedly negative.    Prior evaluation by Dr. Boyce of [facility] thought TMJ was possibly a contributing factor, trigeminal neuralgia was thought to be less likely.  Maxillofacial evaluation also believe pain was due to commendation of rheumatoid arthritis and TMJ.              Past Medical History:     Past Medical History:   Diagnosis Date     Coronary artery disease involving native coronary artery of native heart without angina pectoris     cardiac cath 12/8/2020: mild non-obstructive disease     Daily headache      Recurrent cold sores      Rheumatoid arthritis, rheumatoid factor status unknown (H)               Past Surgical History:     Past Surgical History:   Procedure Laterality Date     COLONOSCOPY       COLONOSCOPY N/A 12/6/2016    Procedure: COLONOSCOPY;  Surgeon: Philip Santiago MD;  Location:  GI     CV LEFT HEART CATH N/A 12/8/2020    Procedure: Left Heart Cath;  Surgeon: Aleksander López MD;  Location:  HEART CARDIAC CATH LAB     CV LEFT VENTRICULOGRAM  N/A 12/8/2020    Procedure: Left Ventriculogram;  Surgeon: Aleksander López MD;  Location:  HEART CARDIAC CATH LAB     ENT SURGERY      T&A     GYN SURGERY      vag hyst             Social History:     Social History     Socioeconomic History     Marital status:      Spouse name: Not on file     Number of children: Not on file     Years of education: Not on file     Highest education level: Not on file   Occupational History     Not on file   Social Needs     Financial resource strain: Not on file     Food insecurity     Worry: Not on file     Inability: Not on file     Transportation needs     Medical: Not on file     Non-medical: Not on file   Tobacco Use     Smoking status: Never Smoker     Smokeless tobacco: Never Used   Substance and Sexual Activity     Alcohol use: Yes     Comment: 2-4 per week     Drug use: No     Sexual activity: Yes     Partners: Male   Lifestyle     Physical activity     Days per week: Not on file     Minutes per session: Not on file     Stress: Not on file   Relationships     Social connections     Talks on phone: Not on file     Gets together: Not on file     Attends Faith service: Not on file     Active member of club or organization: Not on file     Attends meetings of clubs or organizations: Not on file     Relationship status: Not on file     Intimate partner violence     Fear of current or ex partner: Not on file     Emotionally abused: Not on file     Physically abused: Not on file     Forced sexual activity: Not on file   Other Topics Concern     Parent/sibling w/ CABG, MI or angioplasty before 65F 55M? Not Asked   Social History Narrative     Not on file   Formerly worked as an .  Lives with  who is an orthopedic surgeon.  Currently manages rental property.  Reports that her work has flexible hours, she reports she is behind in her work.  Occasional alcohol use.  Denies nicotine use, denies recreational drug use.  Has 3 healthy children.           Family History:     Family History   Problem Relation Age of Onset     Diabetes Father      Cancer Father      Peptic Ulcer Disease Mother      Crohn's Disease Brother      Coronary Artery Disease Brother 60   Mother with headaches          Allergies:      Allergies   Allergen Reactions     Minocycline      Daughter and son reaction     Tetracycline      Daughter and son allergy   .          Medications:     Current Outpatient Medications:      aspirin 81 MG EC tablet, Take 81 mg by mouth daily, Disp: , Rfl:      Calcium 250 MG CAPS, Take 1,000 mg by mouth daily, Disp: , Rfl:      cholecalciferol (VITAMIN D3) 25 mcg (1000 units) capsule, Take 1 capsule by mouth daily, Disp: , Rfl:      Cyanocobalamin (VITAMIN B 12) 100 MCG LOZG, Take 1,000 mcg by mouth daily, Disp: , Rfl:      estradiol (ESTRACE) 1 MG tablet, Take 0.5 mg by mouth Once a Day , Disp: , Rfl:      folic acid (FOLVITE) 1 MG tablet, Take 1 mg by mouth daily, Disp: , Rfl:      hydroxychloroquine (PLAQUENIL) 200 MG tablet, Take 1 tablet (200 mg) by mouth daily Takes 1 tab in am, 1/2 tab in pm, Disp: , Rfl:      methotrexate 2.5 MG tablet, Take 10 mg by mouth once a week, Disp: , Rfl:      nitroGLYcerin (NITROSTAT) 0.4 MG sublingual tablet, For chest pain place 1 tablet under the tongue every 5 minutes for 3 doses. If symptoms persist 5 minutes after 1st dose call 911., Disp: 25 tablet, Rfl: 3     rosuvastatin (CRESTOR) 10 MG tablet, Take 1 tablet (10 mg) by mouth daily, Disp: 90 tablet, Rfl: 3     sulfaSALAzine (AZULFIDINE) 500 MG tablet, Take 1,000 mg by mouth 2 times daily, Disp: , Rfl:           Physical Exam:   No vitals taken during this video visit.  Physical Exam:   General: NAD  Neurologic:   Mental Status Exam: Alert, awake and oriented to situation. No dysarthria. Speech of normal fluency.   Cranial Nerves: Extraocular movements intact (with mild tenderness upon upgaze), no nystagmus, facial movements symmetric (with reported tenderness upon  "raising eyebrows), hearing intact to conversation, tongue midline.  Reported tenderness upon moving tongue side to side.   Motor: No drift in upper extremities. Able to stand from a seated position without use of arms. No tremors or abnormal movements noted.   Coordination: With arms outstretched, able to touch nose using index finger accurately bilaterally.  Normal finger tapping bilaterally.     Gait: Normal stance.    Neck: Normal range of motion with lateral head movements and neck flexion.  Reported tenderness with all neck motions.  Eyes: No conjunctival injection, no scleral icterus.           Data:     MRI brain (1/22/2021) at [facility]:  \"Impression:  1.  No acute intracranial finding.  No abnormality to explain facial pain.  2.  Mild degree of T2 hyperintense signal in the cerebral white matter and tahmina, nonspecific.  This is usually attributed to gliosis from prior ischemic or inflammatory condition.  Broad differential diagnosis includes demyelination, Lyme disease, vasculitis, granulomatous processes, and migraine related change.\"    Temporal artery ultrasound:  \"IMPRESSION: Contour of the left frontal, parietal and temporal  arteries is normal. No inflammatory hypoechoic halo surrounds any of  these arteries. Doppler arterial waveforms are normal. No evidence for  arteritis.\"    Again, thank you for allowing me to participate in the care of your patient.      Sincerely,    Lisa Pleitez MD      "

## 2021-05-05 NOTE — PROGRESS NOTES
Dione is a 65 year old who is being evaluated via a billable video visit.      How would you like to obtain your AVS? MyChart  If the video visit is dropped, the invitation should be resent by: Text to cell phone: [phone]  Will anyone else be joining your video visit? No    Video-Visit Details    Type of service:  Video Visit    Originating Location (pt. Location): Home    Distant Location (provider location):  [facility] [place]     Platform used for Video Visit: [employer]     [facility]  Virtual Neurology Consult     Dione Wilson MRN# [id number]   YOB: 1955 Age: 65 year old     Requesting physician: Rani Callahan MD         Assessment and Recommendations:     Dione Wilson is a 65 year old female with past medical history including seronegative rheumatoid arthritis, possible TMJ arthritis, coronary artery disease who presented in consultation for headache.    # Chronic daily headaches  # Jaw pain  Patient with new onset headaches starting fall 2020.  Headaches started after development of maxillary pain summer 2020.  Bitemporal headaches are a daily occurrence lately.  No clear migrainous features, patient denies photophobia, phonophobia, only with occasional nausea.  Patient denies clear autonomic features.  Recent stressors include the upcoming wedding of her son and lifestyle changes associated with COVID-19 pandemic.  Patient is at risk of medication overuse headache given report of taking 2 Tylenols and 2 Advils scheduled 3 times daily.  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  Prior evaluation included negative laboratory inflammatory markers, negative brain MRI, normal left temporal artery ultrasound, reportedly unremarkable slit-lamp eye evaluation.  In light of prior diagnosis of rheumatoid arthritis, patient is certainly at risk of  temporal arteritis, though less likely given overall unremarkable evaluation to date.  Given extensive prior evaluation, would not add on any additional evaluation at this point.   There is a possibility that there is overlying facial dystonia given physical exam with noted tenderness upon raising eyebrows, upon moving tongue, and with neck motions.    Given daily nature of headache, patient would benefit from a headache preventative measure.  Discussed medication options for alleviating bitemporal pain at today's appointment.  This included topiramate with goal dose 100 mg daily or gabapentin.  Patient declined after discussing side effects of these medications.  Discussed potential use of tricyclic antidepressants in the future.  Discussed possibility of Botox for migraine headache.  In addition, patient may benefit from PM&R evaluation for possible Botox use given prominent musculoskeletal features on exam.  -Patient reported improvement in headache after discontinuation of sulfasalazine.  From a headache review, agree with Humira in place of sulfasalazine for rheumatoid arthritis.  -PM&R referral placed for evaluation by Dr. Gomez  -Patient is at risk of medication withdrawal headache due to current medications including 2 Advil and 2 Tylenol 3 times daily.      -Patient would like to consolidate care within [facility] system.  Placed rheumatology referral for [facility] rheumatologist.  Will defer further temporal arteritis workup rheumatology service, could consider arterial imaging versus temporal artery biopsy.    Patient was seen and discussed with attending physician, Dr. Pleitez.    Coni Tobar MD  PGY-3  Neurology    Physician Attestation   I, Lisa Pleitez MD, saw this patient and agree with the findings and plan of care as documented in the note.      I spent 57 minutes on patient care, coordination of care, and documentation on the day of the visit.    Lisa Pleitez MD              Chief  Complaint:     Chief Complaint   Patient presents with     Consult     VIDEO VISIT NEW           History is obtained from the patient and medical record.  Patient was seen via a virtual visit in their home due to the Covid 19 global pandemic.      Dione Wilson is a 65 year old female with past medical history including seronegative rheumatoid arthritis, possible TMJ arthritis, and coronary artery disease who presents in consultation for headache.    Occasional headache in the past prior to 2020.  These headaches typically resolved with Tylenol.    Patient reports she was diagnosed with seronegative rheumatoid arthritis followed 2019.  In the summer 2020, she developed jaw pain more notable on the left relative to right.  In fall 2020, she developed bitemporal headaches.  Headaches were initially intermittent but are now a constant presence.    Headaches are described as present in both temples.  Has constant dull pain.  This pain turns sharp when there is additional tooth pain associated with jaw pain.  Headaches are typically 3-4 out of 10 in intensity at their worst.  Denies associated photophobia, denies associated phonophobia, has occasional nausea.  Denies lacrimation, occasionally has runny nose as she is outdoors often.  Laying down helps distract her away from the pain.  She finds her self laying down about 3 to 4 days/week and this interrupts her daily activities.    Takes Tylenol and Advil TID for the past 2 months, which is helpful.  Whenever she forgets her lunchtime dose, her headache intensity increases.  She has not tried any prior headache preventative measure.  Denies prior history of beta-blocker use, tricyclic use, or calcium channel blocker use.  Headache intensity improved after she discontinued sulfasalazine (previously prescribed for rheumatoid arthritis).  She also reports improvement after starting methotrexate for arthritis.  While she was on prednisone therapy for rheumatoid  arthritis in the fall 2020, she reported improvement in headache.  She prefers not to start additional systemic medications if at all possible.    Initially denied specific stressors.  However upon further questioning, reported that she does have an upcoming son's wedding.  She reported overall changes in lifestyle due to COVID-19 pandemic.    Prior work-up included unremarkable temporal artery ultrasound, jaw imaging generally unremarkable.  She also underwent MRI brain at [facility] neurology which was unremarkable.  She had a slit-lamp ophthalmology examination which was reportedly negative.    Prior evaluation by Dr. Boyce of [facility] thought TMJ was possibly a contributing factor, trigeminal neuralgia was thought to be less likely.  Maxillofacial evaluation also believe pain was due to commendation of rheumatoid arthritis and TMJ.              Past Medical History:     Past Medical History:   Diagnosis Date     Coronary artery disease involving native coronary artery of native heart without angina pectoris     cardiac cath 12/8/2020: mild non-obstructive disease     Daily headache      Recurrent cold sores      Rheumatoid arthritis, rheumatoid factor status unknown (H)               Past Surgical History:     Past Surgical History:   Procedure Laterality Date     COLONOSCOPY       COLONOSCOPY N/A 12/6/2016    Procedure: COLONOSCOPY;  Surgeon: Philip Santiago MD;  Location:  GI     CV LEFT HEART CATH N/A 12/8/2020    Procedure: Left Heart Cath;  Surgeon: Aleksander López MD;  Location:  HEART CARDIAC CATH LAB     CV LEFT VENTRICULOGRAM N/A 12/8/2020    Procedure: Left Ventriculogram;  Surgeon: Aleksander López MD;  Location:  HEART CARDIAC CATH LAB     ENT SURGERY      T&A     GYN SURGERY      vag hyst             Social History:     Social History     Socioeconomic History     Marital status:      Spouse name: Not on file     Number of  children: Not on file     Years of education: Not on file     Highest education level: Not on file   Occupational History     Not on file   Social Needs     Financial resource strain: Not on file     Food insecurity     Worry: Not on file     Inability: Not on file     Transportation needs     Medical: Not on file     Non-medical: Not on file   Tobacco Use     Smoking status: Never Smoker     Smokeless tobacco: Never Used   Substance and Sexual Activity     Alcohol use: Yes     Comment: 2-4 per week     Drug use: No     Sexual activity: Yes     Partners: Male   Lifestyle     Physical activity     Days per week: Not on file     Minutes per session: Not on file     Stress: Not on file   Relationships     Social connections     Talks on phone: Not on file     Gets together: Not on file     Attends Congregation service: Not on file     Active member of club or organization: Not on file     Attends meetings of clubs or organizations: Not on file     Relationship status: Not on file     Intimate partner violence     Fear of current or ex partner: Not on file     Emotionally abused: Not on file     Physically abused: Not on file     Forced sexual activity: Not on file   Other Topics Concern     Parent/sibling w/ CABG, MI or angioplasty before 65F 55M? Not Asked   Social History Narrative     Not on file   Formerly worked as an .  Lives with  who is an orthopedic surgeon.  Currently manages rental property.  Reports that her work has flexible hours, she reports she is behind in her work.  Occasional alcohol use.  Denies nicotine use, denies recreational drug use.  Has 3 healthy children.          Family History:     Family History   Problem Relation Age of Onset     Diabetes Father      Cancer Father      Peptic Ulcer Disease Mother      Crohn's Disease Brother      Coronary Artery Disease Brother 60   Mother with headaches          Allergies:      Allergies   Allergen Reactions     Minocycline      Daughter and  son reaction     Tetracycline      Daughter and son allergy   .          Medications:     Current Outpatient Medications:      aspirin 81 MG EC tablet, Take 81 mg by mouth daily, Disp: , Rfl:      Calcium 250 MG CAPS, Take 1,000 mg by mouth daily, Disp: , Rfl:      cholecalciferol (VITAMIN D3) 25 mcg (1000 units) capsule, Take 1 capsule by mouth daily, Disp: , Rfl:      Cyanocobalamin (VITAMIN B 12) 100 MCG LOZG, Take 1,000 mcg by mouth daily, Disp: , Rfl:      estradiol (ESTRACE) 1 MG tablet, Take 0.5 mg by mouth Once a Day , Disp: , Rfl:      folic acid (FOLVITE) 1 MG tablet, Take 1 mg by mouth daily, Disp: , Rfl:      hydroxychloroquine (PLAQUENIL) 200 MG tablet, Take 1 tablet (200 mg) by mouth daily Takes 1 tab in am, 1/2 tab in pm, Disp: , Rfl:      methotrexate 2.5 MG tablet, Take 10 mg by mouth once a week, Disp: , Rfl:      nitroGLYcerin (NITROSTAT) 0.4 MG sublingual tablet, For chest pain place 1 tablet under the tongue every 5 minutes for 3 doses. If symptoms persist 5 minutes after 1st dose call 911., Disp: 25 tablet, Rfl: 3     rosuvastatin (CRESTOR) 10 MG tablet, Take 1 tablet (10 mg) by mouth daily, Disp: 90 tablet, Rfl: 3     sulfaSALAzine (AZULFIDINE) 500 MG tablet, Take 1,000 mg by mouth 2 times daily, Disp: , Rfl:           Physical Exam:   No vitals taken during this video visit.  Physical Exam:   General: NAD  Neurologic:   Mental Status Exam: Alert, awake and oriented to situation. No dysarthria. Speech of normal fluency.   Cranial Nerves: Extraocular movements intact (with mild tenderness upon upgaze), no nystagmus, facial movements symmetric (with reported tenderness upon raising eyebrows), hearing intact to conversation, tongue midline.  Reported tenderness upon moving tongue side to side.   Motor: No drift in upper extremities. Able to stand from a seated position without use of arms. No tremors or abnormal movements noted.   Coordination: With arms outstretched, able to touch nose using  "index finger accurately bilaterally.  Normal finger tapping bilaterally.     Gait: Normal stance.    Neck: Normal range of motion with lateral head movements and neck flexion.  Reported tenderness with all neck motions.  Eyes: No conjunctival injection, no scleral icterus.           Data:     MRI brain (1/22/2021) at [facility]:  \"Impression:  1.  No acute intracranial finding.  No abnormality to explain facial pain.  2.  Mild degree of T2 hyperintense signal in the cerebral white matter and tahmina, nonspecific.  This is usually attributed to gliosis from prior ischemic or inflammatory condition.  Broad differential diagnosis includes demyelination, Lyme disease, vasculitis, granulomatous processes, and migraine related change.\"    Temporal artery ultrasound:  \"IMPRESSION: Contour of the left frontal, parietal and temporal  arteries is normal. No inflammatory hypoechoic halo surrounds any of  these arteries. Doppler arterial waveforms are normal. No evidence for  arteritis.\"  "

## 2021-07-13 ENCOUNTER — DOCUMENTATION ONLY (OUTPATIENT)
Dept: CARDIOLOGY | Facility: CLINIC | Age: 66
End: 2021-07-13

## 2021-07-13 NOTE — PROGRESS NOTES
Patient wondering what follow up she should have.  She needs lipids and OV with Dr. López in 12/2021.  Orders have been updated.  I called and left a VM with patient.

## 2021-07-19 ENCOUNTER — MYC MEDICAL ADVICE (OUTPATIENT)
Dept: PEDIATRICS | Facility: CLINIC | Age: 66
End: 2021-07-19

## 2021-07-27 ENCOUNTER — TELEPHONE (OUTPATIENT)
Dept: FAMILY MEDICINE | Facility: CLINIC | Age: 66
End: 2021-07-27

## 2021-07-27 NOTE — TELEPHONE ENCOUNTER
Reason for Call:  Other appointment    Detailed comments: Patient is having a persistent cough that has being going on for a month and would like to be seen in person. She would want an in-person sooner rather than later, or suggest another person to see her in the mean time.    Phone Number Patient can be reached at: Home number on file [phone] (home)    Best Time: Anytime    Can we leave a detailed message on this number? YES    Call taken on 7/27/2021 at 10:29 AM by Samuel Carbajal

## 2021-07-27 NOTE — TELEPHONE ENCOUNTER
Called patient and offered her same day providers or Dr. Grissom, she is scheduled for 8/2 at 2 pm with Eber Ruiz MA

## 2021-07-29 ENCOUNTER — OFFICE VISIT (OUTPATIENT)
Dept: FAMILY MEDICINE | Facility: CLINIC | Age: 66
End: 2021-07-29
Payer: COMMERCIAL

## 2021-07-29 ENCOUNTER — ANCILLARY PROCEDURE (OUTPATIENT)
Dept: GENERAL RADIOLOGY | Facility: CLINIC | Age: 66
End: 2021-07-29
Attending: INTERNAL MEDICINE
Payer: COMMERCIAL

## 2021-07-29 VITALS
BODY MASS INDEX: 24.55 KG/M2 | TEMPERATURE: 97.1 F | OXYGEN SATURATION: 98 % | SYSTOLIC BLOOD PRESSURE: 99 MMHG | WEIGHT: 130 LBS | DIASTOLIC BLOOD PRESSURE: 66 MMHG | HEART RATE: 60 BPM | HEIGHT: 61 IN

## 2021-07-29 DIAGNOSIS — M06.9 RHEUMATOID ARTHRITIS, RHEUMATOID FACTOR STATUS UNKNOWN (H): ICD-10-CM

## 2021-07-29 DIAGNOSIS — D50.9 IRON DEFICIENCY ANEMIA, UNSPECIFIED IRON DEFICIENCY ANEMIA TYPE: ICD-10-CM

## 2021-07-29 DIAGNOSIS — R05.9 COUGH: Primary | ICD-10-CM

## 2021-07-29 DIAGNOSIS — M26.623 BILATERAL TEMPOROMANDIBULAR JOINT PAIN: ICD-10-CM

## 2021-07-29 LAB
ALBUMIN SERPL-MCNC: 3.8 G/DL (ref 3.4–5)
ALP SERPL-CCNC: 67 U/L (ref 40–150)
ALT SERPL W P-5'-P-CCNC: 27 U/L (ref 0–50)
ANION GAP SERPL CALCULATED.3IONS-SCNC: 3 MMOL/L (ref 3–14)
AST SERPL W P-5'-P-CCNC: 19 U/L (ref 0–45)
BASOPHILS # BLD AUTO: 0 10E3/UL (ref 0–0.2)
BASOPHILS NFR BLD AUTO: 0 %
BILIRUB SERPL-MCNC: 0.4 MG/DL (ref 0.2–1.3)
BUN SERPL-MCNC: 10 MG/DL (ref 7–30)
CALCIUM SERPL-MCNC: 9.6 MG/DL (ref 8.5–10.1)
CHLORIDE BLD-SCNC: 106 MMOL/L (ref 94–109)
CO2 SERPL-SCNC: 29 MMOL/L (ref 20–32)
CREAT SERPL-MCNC: 0.7 MG/DL (ref 0.52–1.04)
CRP SERPL-MCNC: 3.4 MG/L (ref 0–8)
EOSINOPHIL # BLD AUTO: 0.2 10E3/UL (ref 0–0.7)
EOSINOPHIL NFR BLD AUTO: 4 %
ERYTHROCYTE [DISTWIDTH] IN BLOOD BY AUTOMATED COUNT: 14 % (ref 10–15)
ERYTHROCYTE [SEDIMENTATION RATE] IN BLOOD BY WESTERGREN METHOD: 11 MM/HR (ref 0–30)
FERRITIN SERPL-MCNC: 86 NG/ML (ref 8–252)
GFR SERPL CREATININE-BSD FRML MDRD: >90 ML/MIN/1.73M2
GLUCOSE BLD-MCNC: 95 MG/DL (ref 70–99)
HCT VFR BLD AUTO: 36.9 % (ref 35–47)
HGB BLD-MCNC: 12.3 G/DL (ref 11.7–15.7)
IRON SATN MFR SERPL: 30 % (ref 15–46)
IRON SERPL-MCNC: 92 UG/DL (ref 35–180)
LYMPHOCYTES # BLD AUTO: 1.9 10E3/UL (ref 0.8–5.3)
LYMPHOCYTES NFR BLD AUTO: 39 %
MCH RBC QN AUTO: 31.6 PG (ref 26.5–33)
MCHC RBC AUTO-ENTMCNC: 33.3 G/DL (ref 31.5–36.5)
MCV RBC AUTO: 95 FL (ref 78–100)
MONOCYTES # BLD AUTO: 0.4 10E3/UL (ref 0–1.3)
MONOCYTES NFR BLD AUTO: 9 %
NEUTROPHILS # BLD AUTO: 2.3 10E3/UL (ref 1.6–8.3)
NEUTROPHILS NFR BLD AUTO: 49 %
PLATELET # BLD AUTO: 346 10E3/UL (ref 150–450)
POTASSIUM BLD-SCNC: 5.1 MMOL/L (ref 3.4–5.3)
PROT SERPL-MCNC: 6.9 G/DL (ref 6.8–8.8)
RBC # BLD AUTO: 3.89 10E6/UL (ref 3.8–5.2)
SODIUM SERPL-SCNC: 138 MMOL/L (ref 133–144)
TIBC SERPL-MCNC: 311 UG/DL (ref 240–430)
WBC # BLD AUTO: 4.8 10E3/UL (ref 4–11)

## 2021-07-29 PROCEDURE — 80053 COMPREHEN METABOLIC PANEL: CPT | Performed by: INTERNAL MEDICINE

## 2021-07-29 PROCEDURE — 83550 IRON BINDING TEST: CPT | Performed by: INTERNAL MEDICINE

## 2021-07-29 PROCEDURE — 36415 COLL VENOUS BLD VENIPUNCTURE: CPT | Performed by: INTERNAL MEDICINE

## 2021-07-29 PROCEDURE — 85025 COMPLETE CBC W/AUTO DIFF WBC: CPT | Performed by: INTERNAL MEDICINE

## 2021-07-29 PROCEDURE — 85652 RBC SED RATE AUTOMATED: CPT | Performed by: INTERNAL MEDICINE

## 2021-07-29 PROCEDURE — 71046 X-RAY EXAM CHEST 2 VIEWS: CPT | Performed by: RADIOLOGY

## 2021-07-29 PROCEDURE — 82728 ASSAY OF FERRITIN: CPT | Performed by: INTERNAL MEDICINE

## 2021-07-29 PROCEDURE — 86140 C-REACTIVE PROTEIN: CPT | Performed by: INTERNAL MEDICINE

## 2021-07-29 PROCEDURE — 99214 OFFICE O/P EST MOD 30 MIN: CPT | Performed by: INTERNAL MEDICINE

## 2021-07-29 RX ORDER — FLUTICASONE PROPIONATE 50 MCG
2 SPRAY, SUSPENSION (ML) NASAL DAILY
Qty: 16 ML | Refills: 3 | Status: SHIPPED | OUTPATIENT
Start: 2021-07-29 | End: 2023-01-13

## 2021-07-29 RX ORDER — CETIRIZINE HYDROCHLORIDE 10 MG/1
10 TABLET ORAL DAILY
Qty: 31 TABLET | Refills: 0 | Status: SHIPPED | OUTPATIENT
Start: 2021-07-29 | End: 2022-01-10

## 2021-07-29 RX ORDER — METHOTREXATE SODIUM 2.5 MG/1
12.5 TABLET ORAL WEEKLY
COMMUNITY
Start: 2021-07-29 | End: 2022-01-10

## 2021-07-29 ASSESSMENT — MIFFLIN-ST. JEOR: SCORE: 1080.23

## 2021-07-29 NOTE — PROGRESS NOTES
This very nice 65 years old lady has rheumatoid arthritis and now is on Humira and methotrexate with Plaquenil  Her TM joint issue is also stable  But since June she has this cough which started with a cold which went through the whole family after attending her son's wedding    I think this is postnasal drip on exam and should start with Zyrtec and Flonase  She is tired and previously was anemic so we will rule out iron deficiency  She will continue with physical therapy for her TM joint  Her rheumatology notes are appreciated    I will check chest x-ray out of neurologist because she is immune compromised otherwise clinically I do not suspect anything      ICD-10-CM    1. Cough  R05 CBC with platelets and differential     ESR: Erythrocyte sedimentation rate     XR Chest 2 Views     ESR: Erythrocyte sedimentation rate     CBC with platelets and differential     XR Chest 2 Views     cetirizine (ZYRTEC) 10 MG tablet     fluticasone (FLONASE) 50 MCG/ACT nasal spray   2. Rheumatoid arthritis, rheumatoid factor status unknown (H)  M06.9 CBC with platelets and differential     ESR: Erythrocyte sedimentation rate     CRP, inflammation     Comprehensive metabolic panel (BMP + Alb, Alk Phos, ALT, AST, Total. Bili, TP)     Comprehensive metabolic panel (BMP + Alb, Alk Phos, ALT, AST, Total. Bili, TP)     CRP, inflammation     ESR: Erythrocyte sedimentation rate     CBC with platelets and differential     Rheumatology Referral   3. Bilateral temporomandibular joint pain  M26.623    4. Iron deficiency anemia, unspecified iron deficiency anemia type  D50.9 CBC with platelets and differential     Iron and iron binding capacity     Ferritin     Ferritin     Iron and iron binding capacity     CBC with platelets and differential           Subjective   Dione is a 65 year old who presents for the following health issues     Chief Complaint   Patient presents with     Cough     Patient is having a persistent cough that has being  "going on for a month.       Patient states that her cough has been lingering for over a month after her son's wedding  Covid test was negative even last week  Her bitemporal mandibular pain is improved since TM joint specialist worked with her  The cough is dry and postnasal drip is there but nasal dripping is clear in nature  There is no shortness of breath or chest pain  She feels tired  Review of Systems   10 point ROS of systems including Constitutional, Eyes, Respiratory, Cardiovascular, Gastroenterology, Genitourinary, Integumentary, Muscularskeletal, Psychiatric were all negative except for pertinent positives noted in my HPI.        Objective    BP 99/66 (BP Location: Left arm, Patient Position: Sitting, Cuff Size: Adult Regular)   Pulse 60   Temp 97.1  F (36.2  C) (Temporal)   Ht 1.554 m (5' 1.2\")   Wt 59 kg (130 lb)   SpO2 98%   BMI 24.40 kg/m    Body mass index is 24.4 kg/m .  Physical Exam   GENERAL: healthy, alert and no distress  HENT: Clear nasal discharge otherwise ear canals and TM's normal, nose and mouth without ulcers or lesions  NECK: no adenopathy, no asymmetry, masses, or scars and thyroid normal to palpation  RESP: lungs clear to auscultation - no rales, rhonchi or wheezes  CV: regular rate and rhythm, normal S1 S2, no S3 or S4, no murmur, click or rub, no peripheral edema and peripheral pulses strong  MS: no gross musculoskeletal defects noted, no edema        Patient Active Problem List   Diagnosis     CARDIOVASCULAR SCREENING; LDL GOAL LESS THAN 160     Other forms of angina pectoris (H)     Status post coronary angiogram     Coronary artery disease involving native coronary artery of native heart without angina pectoris     Rheumatoid arthritis, rheumatoid factor status unknown (H)     Recurrent cold sores     Daily headache     Current Outpatient Medications   Medication     aspirin 81 MG EC tablet     Calcium 250 MG CAPS     cetirizine (ZYRTEC) 10 MG tablet     Cyanocobalamin " (VITAMIN B 12) 100 MCG LOZG     estradiol (ESTRACE) 1 MG tablet     etanercept (ENBREL) 50 MG/ML injection     fluticasone (FLONASE) 50 MCG/ACT nasal spray     folic acid (FOLVITE) 1 MG tablet     hydroxychloroquine (PLAQUENIL) 200 MG tablet     methotrexate 2.5 MG tablet     nitroGLYcerin (NITROSTAT) 0.4 MG sublingual tablet     rosuvastatin (CRESTOR) 10 MG tablet     No current facility-administered medications for this visit.

## 2021-08-02 ENCOUNTER — HOSPITAL ENCOUNTER (OUTPATIENT)
Dept: MAMMOGRAPHY | Facility: CLINIC | Age: 66
Discharge: HOME | End: 2021-08-02
Attending: INTERNAL MEDICINE | Admitting: INTERNAL MEDICINE
Payer: COMMERCIAL

## 2021-08-02 DIAGNOSIS — Z12.31 SCREENING MAMMOGRAM, ENCOUNTER FOR: ICD-10-CM

## 2021-08-02 PROCEDURE — 77063 BREAST TOMOSYNTHESIS BI: CPT

## 2021-09-18 ENCOUNTER — HEALTH MAINTENANCE LETTER (OUTPATIENT)
Age: 66
End: 2021-09-18

## 2021-12-21 ENCOUNTER — TRANSFERRED RECORDS (OUTPATIENT)
Dept: HEALTH INFORMATION MANAGEMENT | Facility: CLINIC | Age: 66
End: 2021-12-21
Payer: COMMERCIAL

## 2021-12-21 LAB
ALT SERPL-CCNC: 20 IU/L (ref 5–35)
AST SERPL-CCNC: 27 U/L (ref 5–34)
CREATININE (EXTERNAL): 0.72 MG/DL (ref 0.5–1.3)

## 2022-01-08 ENCOUNTER — HEALTH MAINTENANCE LETTER (OUTPATIENT)
Age: 67
End: 2022-01-08

## 2022-01-10 ENCOUNTER — OFFICE VISIT (OUTPATIENT)
Dept: FAMILY MEDICINE | Facility: CLINIC | Age: 67
End: 2022-01-10
Payer: COMMERCIAL

## 2022-01-10 VITALS
HEART RATE: 71 BPM | TEMPERATURE: 97.3 F | BODY MASS INDEX: 25.53 KG/M2 | WEIGHT: 136 LBS | OXYGEN SATURATION: 97 % | RESPIRATION RATE: 16 BRPM

## 2022-01-10 DIAGNOSIS — E78.5 HYPERLIPIDEMIA LDL GOAL <100: ICD-10-CM

## 2022-01-10 DIAGNOSIS — Z83.719 FAMILY HX COLONIC POLYPS: ICD-10-CM

## 2022-01-10 DIAGNOSIS — M06.9 RHEUMATOID ARTHRITIS, RHEUMATOID FACTOR STATUS UNKNOWN (H): ICD-10-CM

## 2022-01-10 DIAGNOSIS — E53.8 VITAMIN B12 DEFICIENCY (NON ANEMIC): ICD-10-CM

## 2022-01-10 DIAGNOSIS — Z00.00 ROUTINE GENERAL MEDICAL EXAMINATION AT A HEALTH CARE FACILITY: Primary | ICD-10-CM

## 2022-01-10 DIAGNOSIS — I25.119 CORONARY ARTERY DISEASE INVOLVING NATIVE CORONARY ARTERY OF NATIVE HEART WITH ANGINA PECTORIS (H): ICD-10-CM

## 2022-01-10 DIAGNOSIS — E55.9 VITAMIN D DEFICIENCY: ICD-10-CM

## 2022-01-10 DIAGNOSIS — Z13.6 CARDIOVASCULAR SCREENING; LDL GOAL LESS THAN 160: ICD-10-CM

## 2022-01-10 DIAGNOSIS — M26.609 TMJ (TEMPOROMANDIBULAR JOINT SYNDROME): ICD-10-CM

## 2022-01-10 DIAGNOSIS — Z23 HIGH PRIORITY FOR 2019 NOVEL CORONAVIRUS VACCINATION: ICD-10-CM

## 2022-01-10 DIAGNOSIS — B00.1 RECURRENT COLD SORES: ICD-10-CM

## 2022-01-10 LAB
CHOLEST SERPL-MCNC: 187 MG/DL
CREAT SERPL-MCNC: 0.69 MG/DL (ref 0.52–1.04)
DEPRECATED CALCIDIOL+CALCIFEROL SERPL-MC: 44 UG/L (ref 20–75)
FASTING STATUS PATIENT QL REPORTED: YES
FASTING STATUS PATIENT QL REPORTED: YES
GFR SERPL CREATININE-BSD FRML MDRD: >90 ML/MIN/1.73M2
GLUCOSE BLD-MCNC: 96 MG/DL (ref 70–99)
HDLC SERPL-MCNC: 94 MG/DL
LDLC SERPL CALC-MCNC: 85 MG/DL
NONHDLC SERPL-MCNC: 93 MG/DL
TRIGL SERPL-MCNC: 40 MG/DL
VIT B12 SERPL-MCNC: 1103 PG/ML (ref 193–986)

## 2022-01-10 PROCEDURE — 91306 COVID-19,PF,MODERNA (18+ YRS BOOSTER .25ML): CPT | Performed by: INTERNAL MEDICINE

## 2022-01-10 PROCEDURE — 99214 OFFICE O/P EST MOD 30 MIN: CPT | Mod: 25 | Performed by: INTERNAL MEDICINE

## 2022-01-10 PROCEDURE — 0064A COVID-19,PF,MODERNA (18+ YRS BOOSTER .25ML): CPT | Performed by: INTERNAL MEDICINE

## 2022-01-10 PROCEDURE — 82947 ASSAY GLUCOSE BLOOD QUANT: CPT | Performed by: INTERNAL MEDICINE

## 2022-01-10 PROCEDURE — 36415 COLL VENOUS BLD VENIPUNCTURE: CPT | Performed by: INTERNAL MEDICINE

## 2022-01-10 PROCEDURE — 80061 LIPID PANEL: CPT | Performed by: INTERNAL MEDICINE

## 2022-01-10 PROCEDURE — 99397 PER PM REEVAL EST PAT 65+ YR: CPT | Performed by: INTERNAL MEDICINE

## 2022-01-10 PROCEDURE — 82306 VITAMIN D 25 HYDROXY: CPT | Performed by: INTERNAL MEDICINE

## 2022-01-10 PROCEDURE — 82607 VITAMIN B-12: CPT | Performed by: INTERNAL MEDICINE

## 2022-01-10 PROCEDURE — 82565 ASSAY OF CREATININE: CPT | Performed by: INTERNAL MEDICINE

## 2022-01-10 RX ORDER — METHOTREXATE SODIUM 2.5 MG/1
15 TABLET ORAL WEEKLY
COMMUNITY

## 2022-01-10 RX ORDER — ROSUVASTATIN CALCIUM 10 MG/1
5 TABLET, COATED ORAL DAILY
Qty: 90 TABLET | Refills: 3 | COMMUNITY
Start: 2022-01-10 | End: 2022-05-13

## 2022-01-10 RX ORDER — ESTRADIOL 1 MG/1
0.5 TABLET ORAL EVERY OTHER DAY
COMMUNITY
Start: 2022-01-10 | End: 2023-01-13

## 2022-01-10 RX ORDER — CHOLECALCIFEROL (VITAMIN D3) 50 MCG
1 TABLET ORAL
COMMUNITY
Start: 2022-01-10

## 2022-01-10 ASSESSMENT — ENCOUNTER SYMPTOMS
FREQUENCY: 0
ARTHRALGIAS: 1
NAUSEA: 0
DYSURIA: 0
BREAST MASS: 0
HEMATURIA: 0
CONSTIPATION: 0
HEMATOCHEZIA: 0
WEAKNESS: 0
ABDOMINAL PAIN: 0
DIZZINESS: 0
PARESTHESIAS: 0
HEADACHES: 1
JOINT SWELLING: 1
SORE THROAT: 0
EYE PAIN: 0
CHILLS: 0
FEVER: 0
HEARTBURN: 0
DIARRHEA: 0
PALPITATIONS: 0
SHORTNESS OF BREATH: 0
NERVOUS/ANXIOUS: 0
MYALGIAS: 1
COUGH: 0

## 2022-01-10 ASSESSMENT — PAIN SCALES - GENERAL: PAINLEVEL: NO PAIN (0)

## 2022-01-10 ASSESSMENT — ACTIVITIES OF DAILY LIVING (ADL): CURRENT_FUNCTION: NO ASSISTANCE NEEDED

## 2022-01-10 NOTE — PROGRESS NOTES
SUBJECTIVE:   Dione Wilson is a 66 year old female who presents for Preventive Visit.    Extremely nice 66 years old who has rheumatoid arthritis  Patient is doing quite well with rheumatoid now on biologic agents with Humira but noticing fatigue  She is also on Plaquenil and sees ophthalmology every year  Her recurrent cold sores are stable  She does not have any jaw pain now with the mouthguard that she wears  In regards to angina, she has minimal angina when she water ski s at the extreme exercise  No other symptoms  Patient has been advised of split billing requirements and indicates understanding: Yes   Are you in the first 12 months of your Medicare coverage?  no  HPI  Do you feel safe in your environment? yes    Have you ever done Advance Care Planning? (For example, a Health Directive, POLST, or a discussion with a medical provider or your loved ones about your wishes): Yes, advance care planning is on file.       Fall risk  Fallen 2 or more times in the past year?: No  Any fall with injury in the past year?: No    Cognitive Screening   1) Repeat 3 items (Leader, Season, Table)    2) Clock draw: NORMAL  3) 3 item recall: Recalls 3 objects  Results: 3 items recalled: COGNITIVE IMPAIRMENT LESS LIKELY    Mini-CogTM Copyright S Dash. Licensed by the author for use in [facility]; reprinted with permission (solana@.[facility]). All rights reserved.      Do you have sleep apnea, excessive snoring or daytime drowsiness?: no    Reviewed and updated as needed this visit by clinical staff  Tobacco  Allergies  Meds  Problems  Med Hx  Surg Hx  Fam Hx         Reviewed and updated as needed this visit by Provider   Allergies  Meds  Problems  Med Hx  Surg Hx  Fam Hx        Social History     Tobacco Use     Smoking status: Never Smoker     Smokeless tobacco: Never Used   Substance Use Topics     Alcohol use: Yes     Comment: 2-4 per week     If you drink alcohol do you typically have >3 drinks  per day or >7 drinks per week? No    Alcohol Use 1/10/2022   Prescreen: >3 drinks/day or >7 drinks/week? No   Prescreen: >3 drinks/day or >7 drinks/week? -   No flowsheet data found.            Current providers sharing in care for this patient include:   Patient Care Team:  Rani Callahan MD as PCP - General (Internal Medicine)  Sara Stack MD as MD (Rheumatology)  Marilyn Espinosa PA-C as Assigned Heart and Vascular Provider  Rani Callahan MD as Assigned PCP  Lisa Pleitez MD as Assigned Neuroscience Provider    The following health maintenance items are reviewed in Epic and correct as of today:  Health Maintenance Due   Topic Date Due     ANNUAL REVIEW OF HM ORDERS  Never done     FALL RISK ASSESSMENT  Never done     BP Readings from Last 3 Encounters:   07/29/21 99/66   03/16/21 125/80   12/08/20 95/75    Wt Readings from Last 3 Encounters:   01/10/22 61.7 kg (136 lb)   07/29/21 59 kg (130 lb)   03/16/21 59.4 kg (131 lb)                  Patient Active Problem List   Diagnosis     CARDIOVASCULAR SCREENING; LDL GOAL LESS THAN 160     Other forms of angina pectoris (H)     Status post coronary angiogram     Coronary artery disease involving native coronary artery of native heart without angina pectoris     Rheumatoid arthritis, rheumatoid factor status unknown (H)     Recurrent cold sores     Daily headache     Family hx colonic polyps     Past Surgical History:   Procedure Laterality Date     COLONOSCOPY       COLONOSCOPY N/A 12/6/2016    Procedure: COLONOSCOPY;  Surgeon: Philip Santiago MD;  Location:  GI     COLPORRHAPHY N/A 4/7/2015    Procedure: UTEROSACRAL LIGAMENT SUSPENSION PLICATION,  ANTERIOR POSTERIOR POSSIBLE REPAIR;  Surgeon: Diana Casper MD;  Location: [facility] OR;  Service:      CV LEFT HEART CATH N/A 12/8/2020    Procedure: Left Heart Cath;  Surgeon: Aleksander López MD;  Location:  HEART CARDIAC CATH LAB     CV LEFT VENTRICULOGRAM N/A  12/8/2020    Procedure: Left Ventriculogram;  Surgeon: Aleksander López MD;  Location: [facility] CARDIAC CATH LAB     DILATION AND CURETTAGE       ENT SURGERY      T&A     GYN SURGERY      vag hyst     PELVIC LAPAROSCOPY       NC VAGINAL HYSTERECTOMY,UTERUS 250 GMS/< N/A 4/7/2015    Procedure: VAGINAL HYSTERECTOMY RIGHT SALPINGO O-OPHERECTOMY POSTERIOR REPAIR UTEROSACRAL LIGAMENT SUSPENSION CYSTOSCOPY TRANSVAGINAL TAPE OBTURATOR;  Surgeon: Diana Casper MD;  Location: [facility];  Service: Gynecology     TONSILLECTOMY         Social History     Tobacco Use     Smoking status: Never Smoker     Smokeless tobacco: Never Used   Substance Use Topics     Alcohol use: Yes     Comment: 2-4 per week     Family History   Problem Relation Age of Onset     Diabetes Father      Cancer Father      Peptic Ulcer Disease Mother      Crohn's Disease Brother      Coronary Artery Disease Brother 60     Hypertension Mother      Heart Disease Brother      Breast Cancer Maternal Aunt          Current Outpatient Medications   Medication Sig Dispense Refill     adalimumab (HUMIRA *CF*) 40 MG/0.4ML pen kit Inject 0.4 mLs (40 mg) Subcutaneous every 14 days       estradiol (ESTRACE) 1 MG tablet Take 0.5 tablets (0.5 mg) by mouth every other day       methotrexate 2.5 MG tablet Take 6 tablets (15 mg) by mouth once a week       rosuvastatin (CRESTOR) 10 MG tablet Take 0.5 tablets (5 mg) by mouth daily 90 tablet 3     vitamin D3 (CHOLECALCIFEROL) 50 mcg (2000 units) tablet Take 1 tablet (50 mcg) by mouth every other day       aspirin 81 MG EC tablet Take 81 mg by mouth daily       Calcium 250 MG CAPS Take 1,000 mg by mouth daily       Cyanocobalamin (VITAMIN B 12) 100 MCG LOZG Take 1,000 mcg by mouth daily       fluticasone (FLONASE) 50 MCG/ACT nasal spray Spray 2 sprays into both nostrils daily 16 mL 3     folic acid (FOLVITE) 1 MG tablet Take 2 mg by mouth daily       hydroxychloroquine (PLAQUENIL) 200 MG tablet Take 1  "tablet (200 mg) by mouth daily Takes 1 tab in am, 1/2 tab in pm       nitroGLYcerin (NITROSTAT) 0.4 MG sublingual tablet For chest pain place 1 tablet under the tongue every 5 minutes for 3 doses. If symptoms persist 5 minutes after 1st dose call 911. 25 tablet 3     Allergies   Allergen Reactions     Minocycline      Daughter and son reaction     Tetracycline      Daughter and son allergy       Any new diagnosis of family breast, ovarian, or bowel cancer? No    FHS-7:   Breast CA Risk Assessment (FHS-7) 8/2/2021 1/10/2022   Did any of your first-degree relatives have breast or ovarian cancer? No No   Did any of your relatives have bilateral breast cancer? No No   Did any man in your family have breast cancer? No No   Did any woman in your family have breast and ovarian cancer? Yes Yes   Did any woman in your family have breast cancer before age 50 y? No No   Do you have 2 or more relatives with breast and/or ovarian cancer? No No   Do you have 2 or more relatives with breast and/or bowel cancer? No No       Mammogram Screening: Recommended annual mammography  Pertinent mammograms are reviewed under the imaging tab.    Review of Systems  10 point ROS of systems including Constitutional, Eyes, Respiratory, Cardiovascular, Gastroenterology, Genitourinary, Integumentary, Muscularskeletal, Psychiatric were all negative except for pertinent positives noted in my HPI.      OBJECTIVE:   Pulse 71   Temp 97.3  F (36.3  C) (Temporal)   Resp 16   Wt 61.7 kg (136 lb)   SpO2 97%   BMI 25.53 kg/m   Estimated body mass index is 25.53 kg/m  as calculated from the following:    Height as of 7/29/21: 1.554 m (5' 1.2\").    Weight as of this encounter: 61.7 kg (136 lb).  Physical Exam  GENERAL APPEARANCE: healthy, alert and no distress  EYES: Eyes grossly normal to inspection, PERRL and conjunctivae and sclerae normal  HENT: ear canals and TM's normal, nose and mouth without ulcers or lesions, oropharynx clear and oral mucous " membranes moist  NECK: no adenopathy, no asymmetry, masses, or scars and thyroid normal to palpation  RESP: lungs clear to auscultation - no rales, rhonchi or wheezes  BREAST: normal without masses, tenderness or nipple discharge and no palpable axillary masses or adenopathy  CV: regular rate and rhythm, normal S1 S2, no S3 or S4, no murmur, click or rub, no peripheral edema and peripheral pulses strong  ABDOMEN: soft, nontender, no hepatosplenomegaly, no masses and bowel sounds normal  MS: MCP joints swelling in the right hand and right big toe joint swelling otherwise no musculoskeletal defects are noted and gait is age appropriate without ataxia  SKIN: no suspicious lesions or rashes  NEURO: Normal strength and tone, sensory exam grossly normal, mentation intact and speech normal  PSYCH: mentation appears normal and affect normal/bright        ASSESSMENT / PLAN:   Dione was seen today for physical.    Diagnoses and all orders for this visit:    Routine general medical examination at a health care facility  Very pleasant 66 years old who looks younger than her stated age  Annual mammography is recommended  She is s/p hysterectomy and because of biologic agents, a Pap smear will be continued if cervix is left  I will obtain GYN notes  She will need a DT booster this year  She will also need a Covid booster today  Rheumatoid arthritis, rheumatoid factor status unknown (H)  Doing quite well on Humira, methotrexate and Plaquenil but does have some fatigue  We will obtain labs done in rheumatology office  -     Glucose; Future  -     Creatinine; Future  -     Glucose  -     Creatinine    Coronary artery disease involving native coronary artery of native heart with angina pectoris (H)  Other cardiology notes are reviewed and patient has stable angina with extreme exercise with sports activities  She also has incomplete right bundle branch block and I explained that to her  She will watch for dizziness  -      "Creatinine; Future  -     Creatinine    TMJ (temporomandibular joint syndrome)  Using mouthguard and neurology and rheumatology notes are reviewed  Recurrent cold sores  Stable  Vitamin B12 deficiency (non anemic)  -     Vitamin B12; Future  -     Vitamin B12  Patient is on B12 and folic acid supplementation  Hyperlipidemia LDL goal <100  -     Lipid panel reflex to direct LDL Fasting; Future  -     Lipid panel reflex to direct LDL Fasting  -     Cancel: Lipid panel reflex to direct LDL Fasting    Vitamin D deficiency previously the level was high  -     Vitamin D Deficiency; Future  -     Vitamin D Deficiency    Family hx colonic polyps  -     Colorectal Surgery Referral; Future  Patient will see Dr. Bob or Chaparrita  CARDIOVASCULAR SCREENING; LDL GOAL LESS THAN 160    High priority for 2019 novel coronavirus vaccination  -     COVID-19,PF,MODERNA (18+ YRS BOOSTER .25ML)  Based on the new CDC guidelines patient was given booster        Reviewed preventive health counseling, as reflected in patient instructions       Immunizations    Given Covid booster        Estimated body mass index is 25.53 kg/m  as calculated from the following:    Height as of 7/29/21: 1.554 m (5' 1.2\").    Weight as of this encounter: 61.7 kg (136 lb).        She reports that she has never smoked. She has never used smokeless tobacco.      Appropriate preventive services were discussed with this patient, including applicable screening as appropriate for cardiovascular disease, diabetes, osteopenia/osteoporosis, and glaucoma.  As appropriate for age/gender, discussed screening for colorectal cancer, , breast cancer, and cervical cancer. Checklist reviewing preventive services available has been given to the patient.    Reviewed patients plan of care and provided an AVS. The Complex Care Plan (for patients with higher acuity and needing more deliberate coordination of services) for [place] meets the Care Plan requirement. This Care Plan has " been established and reviewed with the Patient.    Counseling Resources:  ATP IV Guidelines  Pooled Cohorts Equation Calculator  Breast Cancer Risk Calculator  Breast Cancer: Medication to Reduce Risk  FRAX Risk Assessment  ICSI Preventive Guidelines  Dietary Guidelines for Americans, 2010  USDA's MyPlate  ASA Prophylaxis  Lung CA Screening    Rani Callahan MD  [facility]    Identified Health Risks:

## 2022-04-05 ENCOUNTER — TRANSFERRED RECORDS (OUTPATIENT)
Dept: HEALTH INFORMATION MANAGEMENT | Facility: CLINIC | Age: 67
End: 2022-04-05
Payer: COMMERCIAL

## 2022-04-05 LAB
ALT SERPL-CCNC: 19 IU/L (ref 5–35)
AST SERPL-CCNC: 26 U/L (ref 5–34)
CREATININE (EXTERNAL): 0.61 MG/DL (ref 0.5–1.3)

## 2022-04-19 ENCOUNTER — TELEPHONE (OUTPATIENT)
Dept: FAMILY MEDICINE | Facility: CLINIC | Age: 67
End: 2022-04-19

## 2022-04-19 ENCOUNTER — VIRTUAL VISIT (OUTPATIENT)
Dept: FAMILY MEDICINE | Facility: CLINIC | Age: 67
End: 2022-04-19
Payer: COMMERCIAL

## 2022-04-19 DIAGNOSIS — U07.1 INFECTION DUE TO 2019 NOVEL CORONAVIRUS: ICD-10-CM

## 2022-04-19 DIAGNOSIS — U07.1 INFECTION DUE TO 2019 NOVEL CORONAVIRUS: Primary | ICD-10-CM

## 2022-04-19 DIAGNOSIS — M06.9 RHEUMATOID ARTHRITIS, RHEUMATOID FACTOR STATUS UNKNOWN (H): ICD-10-CM

## 2022-04-19 PROCEDURE — 99214 OFFICE O/P EST MOD 30 MIN: CPT | Mod: 95 | Performed by: INTERNAL MEDICINE

## 2022-04-19 RX ORDER — BENZONATATE 200 MG/1
200 CAPSULE ORAL 3 TIMES DAILY PRN
Qty: 30 CAPSULE | Refills: 1 | Status: SHIPPED | OUTPATIENT
Start: 2022-04-19 | End: 2023-01-13

## 2022-04-19 NOTE — TELEPHONE ENCOUNTER
Dr. Callahan,    [facility] Has to be a specialty pharmacy?     They deleted out of system.  Please reorder to one of the pharmacies that carries Paxlovid?    Brianna Patten, RN  [facility] RN Triage Team

## 2022-04-19 NOTE — PROGRESS NOTES
Dione is a 66 year old who is being evaluated via a billable telephone visit.      What phone number would you like to be contacted at? [phone]   How would you like to obtain your AVS? MyChart    Assessment & Plan     Infection due to 2019 novel coronavirus  Clinically patient definitely has COVID infection and her home testing is positive  I will asked the patient to watch for diarrhea or skin rash and watch her blood pressure  She will take cough drops in the form of Tessalon Perles  Negative on Friday and positive today  We will treat Paxlovid dosing would be 3 tablets twice daily with normal renal function.       Drug interactions:   - Flonase should be held for duration of Paxlovid therapy, as well as 3 days beyond completion of Paxlovid.   - Rosuvastatin should also be held for duration of Paxlovid, and 3 days after completion of therapy      - Symptomatic; Yes; 4/15/2022 COVID-19 Virus (Coronavirus) by PCR Nasopharyngeal  - nirmatrelvir and ritonavir (PAXLOVID) therapy pack  Dispense: 30 each; Refill: 0    Rheumatoid arthritis, rheumatoid factor status unknown (H)  Patient will hold methotrexate tomorrow  She has a travel coming up which she could travel if afebrile for 48 hours  Otherwise being immune compromised she may be shedding the virus longer  She always wears a mask  She will check with her rheumatologist about Humira which should probably be postponed to next week                     No follow-ups on file.    Rani Callahan MD  [facility]ULIE Parham is a 66 year old who presents for the following health issues     HPI     Chief Complaint   Patient presents with     URI     COVID Symptoms - took a home test, cough, runny nose, fatigue, sore throat, diarrhea since last April 15         This extremely pleasant 66 years old has been vaccinated but is immunocompromise due to rheumatoid arthritis  Since last Friday she has felt tired and has a sore throat and nasal  discharge  She has been coughing but no chest pain or shortness of breath  COVID rapid test was negative on Friday but positive today  She did have Easter dinner with her family   has some allergies like symptoms but nobody else is sick  She does have some diarrhea      Review of Systems   10 point ROS of systems including Constitutional, Eyes, Respiratory, Cardiovascular, Gastroenterology, Genitourinary, Integumentary, Muscularskeletal, Psychiatric were all negative except for pertinent positives noted in my HPI.        Objective    Vitals - Patient Reported  Pain Score: Moderate Pain (4)  Pain Loc: Head    Patient's pulse is 72 and oxygen level 99% by her watch  Vitals:  No vitals were obtained today due to virtual visit.    Physical Exam   healthy, alert and no distress  PSYCH: Alert and oriented times 3; coherent speech, normal   rate and volume, able to articulate logical thoughts, able   to abstract reason, no tangential thoughts, no hallucinations   or delusions  Her affect is normal  RESP: No cough, no audible wheezing, able to talk in full sentences  Remainder of exam unable to be completed due to telephone visits    Patient Active Problem List   Diagnosis     CARDIOVASCULAR SCREENING; LDL GOAL LESS THAN 160     Other forms of angina pectoris (H)     Status post coronary angiogram     Coronary artery disease involving native coronary artery of native heart without angina pectoris     Rheumatoid arthritis, rheumatoid factor status unknown (H)     Recurrent cold sores     Daily headache     Family hx colonic polyps     Current Outpatient Medications   Medication     adalimumab (HUMIRA *CF*) 40 MG/0.4ML pen kit     aspirin 81 MG EC tablet     benzonatate (TESSALON) 200 MG capsule     Calcium 250 MG CAPS     Cyanocobalamin (VITAMIN B 12) 100 MCG LOZG     estradiol (ESTRACE) 1 MG tablet     fluticasone (FLONASE) 50 MCG/ACT nasal spray     folic acid (FOLVITE) 1 MG tablet     hydroxychloroquine (PLAQUENIL)  200 MG tablet     methotrexate 2.5 MG tablet     nirmatrelvir and ritonavir (PAXLOVID) therapy pack     nitroGLYcerin (NITROSTAT) 0.4 MG sublingual tablet     rosuvastatin (CRESTOR) 10 MG tablet     vitamin D3 (CHOLECALCIFEROL) 50 mcg (2000 units) tablet     No current facility-administered medications for this visit.                 Phone call duration: 15 minutes

## 2022-04-19 NOTE — TELEPHONE ENCOUNTER
Called [facility] pharmacy and confirmed Rx Paxlovid was received, processed successfully. Patient has been notified for curbside .     Perla BAGLEY, RN      April 19, 2022  3:32 PM

## 2022-05-12 DIAGNOSIS — E78.5 HYPERLIPIDEMIA LDL GOAL <100: ICD-10-CM

## 2022-05-13 RX ORDER — ROSUVASTATIN CALCIUM 10 MG/1
TABLET, COATED ORAL
Qty: 90 TABLET | Refills: 1 | Status: SHIPPED | OUTPATIENT
Start: 2022-05-13 | End: 2023-02-28

## 2022-05-13 NOTE — TELEPHONE ENCOUNTER
Routing refill request to provider for review/approval because:  Drug interaction warning  Audrey Lopez, RN

## 2022-08-30 ENCOUNTER — HOSPITAL ENCOUNTER (OUTPATIENT)
Dept: MAMMOGRAPHY | Facility: CLINIC | Age: 67
Discharge: HOME | End: 2022-08-30
Attending: INTERNAL MEDICINE | Admitting: INTERNAL MEDICINE
Payer: COMMERCIAL

## 2022-08-30 DIAGNOSIS — Z12.31 VISIT FOR SCREENING MAMMOGRAM: ICD-10-CM

## 2022-08-30 PROCEDURE — 77067 SCR MAMMO BI INCL CAD: CPT

## 2022-11-08 ENCOUNTER — TRANSFERRED RECORDS (OUTPATIENT)
Dept: HEALTH INFORMATION MANAGEMENT | Facility: CLINIC | Age: 67
End: 2022-11-08

## 2022-11-08 LAB
ALT SERPL-CCNC: 28 IU/L (ref 5–35)
AST SERPL-CCNC: 25 U/L (ref 5–34)
CREATININE (EXTERNAL): 0.65 MG/DL (ref 0.5–1.3)
GFR ESTIMATED (EXTERNAL): 96.9 ML/MIN/1.73M2

## 2022-11-20 ENCOUNTER — HEALTH MAINTENANCE LETTER (OUTPATIENT)
Age: 67
End: 2022-11-20

## 2023-01-11 ENCOUNTER — MYC MEDICAL ADVICE (OUTPATIENT)
Dept: FAMILY MEDICINE | Facility: CLINIC | Age: 68
End: 2023-01-11

## 2023-01-11 DIAGNOSIS — R05.1 ACUTE COUGH: Primary | ICD-10-CM

## 2023-01-12 ENCOUNTER — ANCILLARY PROCEDURE (OUTPATIENT)
Dept: GENERAL RADIOLOGY | Facility: CLINIC | Age: 68
End: 2023-01-12
Attending: INTERNAL MEDICINE
Payer: COMMERCIAL

## 2023-01-12 ENCOUNTER — LAB (OUTPATIENT)
Dept: LAB | Facility: CLINIC | Age: 68
End: 2023-01-12
Payer: COMMERCIAL

## 2023-01-12 DIAGNOSIS — R05.1 ACUTE COUGH: ICD-10-CM

## 2023-01-12 DIAGNOSIS — Z13.220 SCREENING FOR HYPERLIPIDEMIA: ICD-10-CM

## 2023-01-12 DIAGNOSIS — I25.10 CORONARY ARTERY DISEASE INVOLVING NATIVE CORONARY ARTERY OF NATIVE HEART WITHOUT ANGINA PECTORIS: ICD-10-CM

## 2023-01-12 LAB
ALBUMIN SERPL BCG-MCNC: 4.5 G/DL (ref 3.5–5.2)
ALP SERPL-CCNC: 75 U/L (ref 35–104)
ALT SERPL W P-5'-P-CCNC: 29 U/L (ref 10–35)
ANION GAP SERPL CALCULATED.3IONS-SCNC: 12 MMOL/L (ref 7–15)
AST SERPL W P-5'-P-CCNC: 30 U/L (ref 10–35)
BASOPHILS # BLD AUTO: 0 10E3/UL (ref 0–0.2)
BASOPHILS NFR BLD AUTO: 0 %
BILIRUB SERPL-MCNC: 0.2 MG/DL
BUN SERPL-MCNC: 15.4 MG/DL (ref 8–23)
CALCIUM SERPL-MCNC: 9.4 MG/DL (ref 8.8–10.2)
CHLORIDE SERPL-SCNC: 103 MMOL/L (ref 98–107)
CHOLEST SERPL-MCNC: 198 MG/DL
CREAT SERPL-MCNC: 0.68 MG/DL (ref 0.51–0.95)
CRP SERPL-MCNC: 5.74 MG/L
DEPRECATED HCO3 PLAS-SCNC: 24 MMOL/L (ref 22–29)
EOSINOPHIL # BLD AUTO: 0.3 10E3/UL (ref 0–0.7)
EOSINOPHIL NFR BLD AUTO: 3 %
ERYTHROCYTE [DISTWIDTH] IN BLOOD BY AUTOMATED COUNT: 13.6 % (ref 10–15)
GFR SERPL CREATININE-BSD FRML MDRD: >90 ML/MIN/1.73M2
GLUCOSE SERPL-MCNC: 103 MG/DL (ref 70–99)
HCT VFR BLD AUTO: 40.2 % (ref 35–47)
HDLC SERPL-MCNC: 96 MG/DL
HGB BLD-MCNC: 12.9 G/DL (ref 11.7–15.7)
IMM GRANULOCYTES # BLD: 0 10E3/UL
IMM GRANULOCYTES NFR BLD: 0 %
LDLC SERPL CALC-MCNC: 89 MG/DL
LYMPHOCYTES # BLD AUTO: 2.3 10E3/UL (ref 0.8–5.3)
LYMPHOCYTES NFR BLD AUTO: 25 %
MCH RBC QN AUTO: 30.4 PG (ref 26.5–33)
MCHC RBC AUTO-ENTMCNC: 32.1 G/DL (ref 31.5–36.5)
MCV RBC AUTO: 95 FL (ref 78–100)
MONOCYTES # BLD AUTO: 0.9 10E3/UL (ref 0–1.3)
MONOCYTES NFR BLD AUTO: 9 %
NEUTROPHILS # BLD AUTO: 5.6 10E3/UL (ref 1.6–8.3)
NEUTROPHILS NFR BLD AUTO: 62 %
NONHDLC SERPL-MCNC: 102 MG/DL
PLATELET # BLD AUTO: 315 10E3/UL (ref 150–450)
POTASSIUM SERPL-SCNC: 4.5 MMOL/L (ref 3.4–5.3)
PROT SERPL-MCNC: 6.8 G/DL (ref 6.4–8.3)
RBC # BLD AUTO: 4.24 10E6/UL (ref 3.8–5.2)
SODIUM SERPL-SCNC: 139 MMOL/L (ref 136–145)
TRIGL SERPL-MCNC: 66 MG/DL
WBC # BLD AUTO: 9.1 10E3/UL (ref 4–11)

## 2023-01-12 PROCEDURE — 86140 C-REACTIVE PROTEIN: CPT

## 2023-01-12 PROCEDURE — 36415 COLL VENOUS BLD VENIPUNCTURE: CPT

## 2023-01-12 PROCEDURE — 80053 COMPREHEN METABOLIC PANEL: CPT

## 2023-01-12 PROCEDURE — 71046 X-RAY EXAM CHEST 2 VIEWS: CPT | Mod: TC | Performed by: RADIOLOGY

## 2023-01-12 PROCEDURE — 85025 COMPLETE CBC W/AUTO DIFF WBC: CPT

## 2023-01-12 PROCEDURE — 80061 LIPID PANEL: CPT

## 2023-01-12 NOTE — TELEPHONE ENCOUNTER
Good day Dr. Callahan,    Sending message as an FYI. Called and spoke to patient - scheduled a visit with team provider (Aravind Petty NP) at 11:10 am for cough.       Corrie LORENZ MA on 1/12/2023 at 7:27 AM

## 2023-01-13 ENCOUNTER — OFFICE VISIT (OUTPATIENT)
Dept: FAMILY MEDICINE | Facility: CLINIC | Age: 68
End: 2023-01-13
Payer: COMMERCIAL

## 2023-01-13 VITALS
BODY MASS INDEX: 25.3 KG/M2 | TEMPERATURE: 96.8 F | HEIGHT: 61 IN | OXYGEN SATURATION: 99 % | WEIGHT: 134 LBS | HEART RATE: 68 BPM | DIASTOLIC BLOOD PRESSURE: 65 MMHG | SYSTOLIC BLOOD PRESSURE: 105 MMHG | RESPIRATION RATE: 20 BRPM

## 2023-01-13 DIAGNOSIS — Z80.0 FAMILY HISTORY MALIGNANT NEOPLASM OF BILIARY TRACT: ICD-10-CM

## 2023-01-13 DIAGNOSIS — I25.119 CORONARY ARTERY DISEASE INVOLVING NATIVE CORONARY ARTERY OF NATIVE HEART WITH ANGINA PECTORIS (H): ICD-10-CM

## 2023-01-13 DIAGNOSIS — R05.2 SUBACUTE COUGH: Primary | ICD-10-CM

## 2023-01-13 DIAGNOSIS — M06.9 RHEUMATOID ARTHRITIS, RHEUMATOID FACTOR STATUS UNKNOWN (H): ICD-10-CM

## 2023-01-13 DIAGNOSIS — Z78.0 MENOPAUSE: ICD-10-CM

## 2023-01-13 DIAGNOSIS — R63.0 LOSS OF APPETITE: ICD-10-CM

## 2023-01-13 PROCEDURE — 99214 OFFICE O/P EST MOD 30 MIN: CPT | Performed by: INTERNAL MEDICINE

## 2023-01-13 RX ORDER — BENZONATATE 100 MG/1
100 CAPSULE ORAL 3 TIMES DAILY PRN
Qty: 30 CAPSULE | Refills: 1 | Status: SHIPPED | OUTPATIENT
Start: 2023-01-13 | End: 2023-06-26

## 2023-01-13 RX ORDER — PREDNISONE 20 MG/1
40 TABLET ORAL DAILY
Qty: 10 TABLET | Refills: 0 | Status: SHIPPED | OUTPATIENT
Start: 2023-01-13 | End: 2023-06-26

## 2023-01-13 RX ORDER — ESTRADIOL 1 MG/1
0.5 TABLET ORAL EVERY OTHER DAY
Qty: 90 TABLET | Refills: 4 | Status: SHIPPED | OUTPATIENT
Start: 2023-01-13 | End: 2023-10-02 | Stop reason: ALTCHOICE

## 2023-01-13 RX ORDER — PANTOPRAZOLE SODIUM 40 MG/1
40 TABLET, DELAYED RELEASE ORAL DAILY
Qty: 31 TABLET | Refills: 0 | Status: SHIPPED | OUTPATIENT
Start: 2023-01-13 | End: 2023-06-26

## 2023-01-13 RX ORDER — ESTRADIOL 1 MG/1
0.5 TABLET ORAL EVERY OTHER DAY
Qty: 90 TABLET | Refills: 4 | Status: SHIPPED | OUTPATIENT
Start: 2023-01-13 | End: 2023-01-13

## 2023-01-13 ASSESSMENT — PAIN SCALES - GENERAL: PAINLEVEL: NO PAIN (0)

## 2023-01-13 NOTE — PATIENT INSTRUCTIONS
Pulsatilla 6c homeopathic- 2 days    Tessalon pearls Three times daily if needed for cough    Prednisone 40 mg daily for 5 days with food after taking Protonix 40 mg daily    Take Protonix for 2 weeks

## 2023-01-13 NOTE — PROGRESS NOTES
Assessment & Plan     Subacute cough  Patient tested Negative for COVID  Food makes her cough worse and we will do the following  Chest x-ray and labs are discussed  It is too late to check for RSV  - predniSONE (DELTASONE) 20 MG tablet  Dispense: 10 tablet; Refill: 0  - benzonatate (TESSALON) 100 MG capsule  Dispense: 30 capsule; Refill: 1  - pantoprazole (PROTONIX) 40 MG EC tablet  Dispense: 31 tablet; Refill: 0    Rheumatoid arthritis, rheumatoid factor status unknown (H)  Patient is immune compromised and is on methotrexate also and Plaquenil  - adalimumab (HUMIRA *CF*) 40 MG/0.4ML pen kit    Coronary artery disease involving native coronary artery of native heart with angina pectoris (H)  She does have some chest heaviness and we will use Protonix but she has no exertional symptoms    Loss of appetite  She has family history of cholangiocarcinoma and colon  - CT Abdomen Pelvis w/o Contrast    Family history malignant neoplasm of biliary tract  As above  - CT Abdomen Pelvis w/o Contrast    Menopause  Due for a refill  - estradiol (ESTRACE) 1 MG tablet  Dispense: 90 tablet; Refill: 4      We will use a Prevnar 20 upon her return visit  Return in about 6 months (around 7/13/2023) for Physical Exam.    Rani Callahan MD  [facility] is a 67 year old, presenting for the following health issues:  Cough    Around the new year time patient's daughter was sick and she was sick  COVID test was negative  There was no shortness of breath or chills but she has poor appetite for months now  She just does not feel hungry  She does not have abdominal discomfort  But she has deep severe cough and sinus congestion  There is no loss of smell  History of Present Illness       Reason for visit:  Extreme cough that has lasted for nearly 2 weeks  Symptom onset:  1-2 weeks ago  Symptom intensity:  Severe  Symptom progression:  Worsening  Had these symptoms before:  Yes  Has tried/received  "treatment for these symptoms:  Yes  Previous treatment was successful:  Yes  Prior treatment description:  Dr. Callahan provided benzonate(sp?) for the cough when I had covid  What makes it worse:  Exercising outside  What makes it better:  Dayquil or nyquil    She eats 2-3 servings of fruits and vegetables daily.She consumes 0 sweetened beverage(s) daily.She exercises with enough effort to increase her heart rate 60 or more minutes per day.  She exercises with enough effort to increase her heart rate 4 days per week.   She is taking medications regularly.         Review of Systems   10 point ROS of systems including Constitutional, Eyes, Respiratory, Cardiovascular, Gastroenterology, Genitourinary, Integumentary, Muscularskeletal, Psychiatric were all negative except for pertinent positives noted in my HPI.        Objective    /65 (BP Location: Left arm, Patient Position: Sitting, Cuff Size: Adult Regular)   Pulse 68   Temp 96.8  F (36  C) (Temporal)   Resp 20   Ht 1.552 m (5' 1.1\")   Wt 60.8 kg (134 lb)   SpO2 99%   BMI 25.24 kg/m    Body mass index is 25.24 kg/m .  Physical Exam   GENERAL: healthy, alert and no distress  NECK: no adenopathy, no asymmetry, masses, or scars and thyroid normal to palpation  RESP: lungs clear to auscultation - no rales, rhonchi or wheezes  CV: regular rate and rhythm, normal S1 S2, no S3 or S4, no murmur, click or rub, no peripheral edema and peripheral pulses strong  ABDOMEN: soft, nontender, no hepatosplenomegaly, no masses and bowel sounds normal  MS: no gross musculoskeletal defects noted, no edema  HEENT exam reveals nasal dripping  Patient Active Problem List   Diagnosis     CARDIOVASCULAR SCREENING; LDL GOAL LESS THAN 160     Other forms of angina pectoris (H)     Status post coronary angiogram     Coronary artery disease involving native coronary artery of native heart without angina pectoris     Rheumatoid arthritis, rheumatoid factor status unknown (H)     " Recurrent cold sores     Daily headache     Family hx colonic polyps     Current Outpatient Medications   Medication     adalimumab (HUMIRA *CF*) 40 MG/0.4ML pen kit     aspirin 81 MG EC tablet     benzonatate (TESSALON) 100 MG capsule     Calcium 250 MG CAPS     estradiol (ESTRACE) 1 MG tablet     folic acid (FOLVITE) 1 MG tablet     hydroxychloroquine (PLAQUENIL) 200 MG tablet     methotrexate 2.5 MG tablet     nitroGLYcerin (NITROSTAT) 0.4 MG sublingual tablet     pantoprazole (PROTONIX) 40 MG EC tablet     predniSONE (DELTASONE) 20 MG tablet     rosuvastatin (CRESTOR) 10 MG tablet     vitamin D3 (CHOLECALCIFEROL) 50 mcg (2000 units) tablet     No current facility-administered medications for this visit.         Lab on 01/12/2023   Component Date Value Ref Range Status     CRP Inflammation 01/12/2023 5.74 (H)  <5.00 mg/L Final     Sodium 01/12/2023 139  136 - 145 mmol/L Final     Potassium 01/12/2023 4.5  3.4 - 5.3 mmol/L Final     Chloride 01/12/2023 103  98 - 107 mmol/L Final     Carbon Dioxide (CO2) 01/12/2023 24  22 - 29 mmol/L Final     Anion Gap 01/12/2023 12  7 - 15 mmol/L Final     Urea Nitrogen 01/12/2023 15.4  8.0 - 23.0 mg/dL Final     Creatinine 01/12/2023 0.68  0.51 - 0.95 mg/dL Final     Calcium 01/12/2023 9.4  8.8 - 10.2 mg/dL Final     Glucose 01/12/2023 103 (H)  70 - 99 mg/dL Final     Alkaline Phosphatase 01/12/2023 75  35 - 104 U/L Final     AST 01/12/2023 30  10 - 35 U/L Final     ALT 01/12/2023 29  10 - 35 U/L Final     Protein Total 01/12/2023 6.8  6.4 - 8.3 g/dL Final     Albumin 01/12/2023 4.5  3.5 - 5.2 g/dL Final     Bilirubin Total 01/12/2023 0.2  <=1.2 mg/dL Final     GFR Estimate 01/12/2023 >90  >60 mL/min/1.73m2 Final    Effective December 21, 2021 eGFRcr in adults is calculated using the 2021 CKD-EPI creatinine equation which includes age and gender (Kristi et al., NEJM, DOI: 10.1056/JDIKqz3591789)     Cholesterol 01/12/2023 198  <200 mg/dL Final     Triglycerides 01/12/2023 66  <150  mg/dL Final     Direct Measure HDL 01/12/2023 96  >=50 mg/dL Final     LDL Cholesterol Calculated 01/12/2023 89  <=100 mg/dL Final     Non HDL Cholesterol 01/12/2023 102  <130 mg/dL Final     WBC Count 01/12/2023 9.1  4.0 - 11.0 10e3/uL Final     RBC Count 01/12/2023 4.24  3.80 - 5.20 10e6/uL Final     Hemoglobin 01/12/2023 12.9  11.7 - 15.7 g/dL Final     Hematocrit 01/12/2023 40.2  35.0 - 47.0 % Final     MCV 01/12/2023 95  78 - 100 fL Final     MCH 01/12/2023 30.4  26.5 - 33.0 pg Final     MCHC 01/12/2023 32.1  31.5 - 36.5 g/dL Final     RDW 01/12/2023 13.6  10.0 - 15.0 % Final     Platelet Count 01/12/2023 315  150 - 450 10e3/uL Final     % Neutrophils 01/12/2023 62  % Final     % Lymphocytes 01/12/2023 25  % Final     % Monocytes 01/12/2023 9  % Final     % Eosinophils 01/12/2023 3  % Final     % Basophils 01/12/2023 0  % Final     % Immature Granulocytes 01/12/2023 0  % Final     Absolute Neutrophils 01/12/2023 5.6  1.6 - 8.3 10e3/uL Final     Absolute Lymphocytes 01/12/2023 2.3  0.8 - 5.3 10e3/uL Final     Absolute Monocytes 01/12/2023 0.9  0.0 - 1.3 10e3/uL Final     Absolute Eosinophils 01/12/2023 0.3  0.0 - 0.7 10e3/uL Final     Absolute Basophils 01/12/2023 0.0  0.0 - 0.2 10e3/uL Final     Absolute Immature Granulocytes 01/12/2023 0.0  <=0.4 10e3/uL Final

## 2023-02-14 ENCOUNTER — TRANSFERRED RECORDS (OUTPATIENT)
Dept: HEALTH INFORMATION MANAGEMENT | Facility: CLINIC | Age: 68
End: 2023-02-14
Payer: COMMERCIAL

## 2023-02-14 LAB
ALT SERPL-CCNC: 21 IU/L (ref 5–35)
AST SERPL-CCNC: 25 U/L (ref 5–34)
CREATININE (EXTERNAL): 0.64 MG/DL (ref 0.5–1.3)

## 2023-02-28 DIAGNOSIS — E78.5 HYPERLIPIDEMIA LDL GOAL <100: ICD-10-CM

## 2023-02-28 RX ORDER — ROSUVASTATIN CALCIUM 10 MG/1
TABLET, COATED ORAL
Qty: 90 TABLET | Refills: 1 | Status: SHIPPED | OUTPATIENT
Start: 2023-02-28 | End: 2023-07-20

## 2023-04-15 ENCOUNTER — HEALTH MAINTENANCE LETTER (OUTPATIENT)
Age: 68
End: 2023-04-15

## 2023-05-16 ENCOUNTER — TRANSFERRED RECORDS (OUTPATIENT)
Dept: HEALTH INFORMATION MANAGEMENT | Facility: CLINIC | Age: 68
End: 2023-05-16
Payer: COMMERCIAL

## 2023-05-16 LAB
ALT SERPL-CCNC: 20 IU/L (ref 5–35)
AST SERPL-CCNC: 27 U/L (ref 5–34)
CREATININE (EXTERNAL): 0.68 MG/DL (ref 0.5–1.3)

## 2023-06-17 ENCOUNTER — HOSPITAL ENCOUNTER (EMERGENCY)
Facility: CLINIC | Age: 68
Discharge: HOME | End: 2023-06-17
Attending: EMERGENCY MEDICINE | Admitting: EMERGENCY MEDICINE
Payer: COMMERCIAL

## 2023-06-17 ENCOUNTER — APPOINTMENT (OUTPATIENT)
Dept: CT IMAGING | Facility: CLINIC | Age: 68
End: 2023-06-17
Attending: EMERGENCY MEDICINE
Payer: COMMERCIAL

## 2023-06-17 ENCOUNTER — APPOINTMENT (OUTPATIENT)
Dept: MRI IMAGING | Facility: CLINIC | Age: 68
End: 2023-06-17
Attending: EMERGENCY MEDICINE
Payer: COMMERCIAL

## 2023-06-17 VITALS
RESPIRATION RATE: 14 BRPM | HEIGHT: 61 IN | BODY MASS INDEX: 25.49 KG/M2 | OXYGEN SATURATION: 99 % | DIASTOLIC BLOOD PRESSURE: 69 MMHG | SYSTOLIC BLOOD PRESSURE: 120 MMHG | TEMPERATURE: 98 F | WEIGHT: 135 LBS | HEART RATE: 80 BPM

## 2023-06-17 DIAGNOSIS — G45.4 TRANSIENT GLOBAL AMNESIA: ICD-10-CM

## 2023-06-17 LAB
ANION GAP SERPL CALCULATED.3IONS-SCNC: 13 MMOL/L (ref 7–15)
APTT PPP: 34 SECONDS (ref 22–38)
ATRIAL RATE - MUSE: 72 BPM
BASOPHILS # BLD AUTO: 0.1 10E3/UL (ref 0–0.2)
BASOPHILS NFR BLD AUTO: 1 %
BUN SERPL-MCNC: 13.9 MG/DL (ref 8–23)
CALCIUM SERPL-MCNC: 9.1 MG/DL (ref 8.8–10.2)
CHLORIDE SERPL-SCNC: 105 MMOL/L (ref 98–107)
CREAT SERPL-MCNC: 0.57 MG/DL (ref 0.51–0.95)
DEPRECATED HCO3 PLAS-SCNC: 21 MMOL/L (ref 22–29)
DIASTOLIC BLOOD PRESSURE - MUSE: NORMAL MMHG
EOSINOPHIL # BLD AUTO: 0.2 10E3/UL (ref 0–0.7)
EOSINOPHIL NFR BLD AUTO: 2 %
ERYTHROCYTE [DISTWIDTH] IN BLOOD BY AUTOMATED COUNT: 13.7 % (ref 10–15)
GFR SERPL CREATININE-BSD FRML MDRD: >90 ML/MIN/1.73M2
GLUCOSE SERPL-MCNC: 110 MG/DL (ref 70–99)
HCT VFR BLD AUTO: 38.7 % (ref 35–47)
HGB BLD-MCNC: 13.2 G/DL (ref 11.7–15.7)
IMM GRANULOCYTES # BLD: 0 10E3/UL
IMM GRANULOCYTES NFR BLD: 0 %
INR PPP: 1.01 (ref 0.85–1.15)
INTERPRETATION ECG - MUSE: NORMAL
LYMPHOCYTES # BLD AUTO: 2.4 10E3/UL (ref 0.8–5.3)
LYMPHOCYTES NFR BLD AUTO: 32 %
MCH RBC QN AUTO: 31 PG (ref 26.5–33)
MCHC RBC AUTO-ENTMCNC: 34.1 G/DL (ref 31.5–36.5)
MCV RBC AUTO: 91 FL (ref 78–100)
MONOCYTES # BLD AUTO: 0.6 10E3/UL (ref 0–1.3)
MONOCYTES NFR BLD AUTO: 8 %
NEUTROPHILS # BLD AUTO: 4.4 10E3/UL (ref 1.6–8.3)
NEUTROPHILS NFR BLD AUTO: 57 %
NRBC # BLD AUTO: 0 10E3/UL
NRBC BLD AUTO-RTO: 0 /100
P AXIS - MUSE: 70 DEGREES
PLATELET # BLD AUTO: 304 10E3/UL (ref 150–450)
POTASSIUM SERPL-SCNC: 4.2 MMOL/L (ref 3.4–5.3)
PR INTERVAL - MUSE: 146 MS
QRS DURATION - MUSE: 84 MS
QT - MUSE: 424 MS
QTC - MUSE: 464 MS
R AXIS - MUSE: -54 DEGREES
RBC # BLD AUTO: 4.26 10E6/UL (ref 3.8–5.2)
SODIUM SERPL-SCNC: 139 MMOL/L (ref 136–145)
SYSTOLIC BLOOD PRESSURE - MUSE: NORMAL MMHG
T AXIS - MUSE: 35 DEGREES
TROPONIN T SERPL HS-MCNC: <6 NG/L
VENTRICULAR RATE- MUSE: 72 BPM
WBC # BLD AUTO: 7.7 10E3/UL (ref 4–11)

## 2023-06-17 PROCEDURE — 70496 CT ANGIOGRAPHY HEAD: CPT

## 2023-06-17 PROCEDURE — 36415 COLL VENOUS BLD VENIPUNCTURE: CPT | Performed by: EMERGENCY MEDICINE

## 2023-06-17 PROCEDURE — A9585 GADOBUTROL INJECTION: HCPCS | Performed by: EMERGENCY MEDICINE

## 2023-06-17 PROCEDURE — 93005 ELECTROCARDIOGRAM TRACING: CPT

## 2023-06-17 PROCEDURE — 85730 THROMBOPLASTIN TIME PARTIAL: CPT | Performed by: EMERGENCY MEDICINE

## 2023-06-17 PROCEDURE — 250N000011 HC RX IP 250 OP 636: Performed by: EMERGENCY MEDICINE

## 2023-06-17 PROCEDURE — 70498 CT ANGIOGRAPHY NECK: CPT

## 2023-06-17 PROCEDURE — 85025 COMPLETE CBC W/AUTO DIFF WBC: CPT | Performed by: EMERGENCY MEDICINE

## 2023-06-17 PROCEDURE — 255N000002 HC RX 255 OP 636: Performed by: EMERGENCY MEDICINE

## 2023-06-17 PROCEDURE — 70553 MRI BRAIN STEM W/O & W/DYE: CPT

## 2023-06-17 PROCEDURE — 250N000009 HC RX 250: Performed by: EMERGENCY MEDICINE

## 2023-06-17 PROCEDURE — 80048 BASIC METABOLIC PNL TOTAL CA: CPT | Performed by: EMERGENCY MEDICINE

## 2023-06-17 PROCEDURE — 70450 CT HEAD/BRAIN W/O DYE: CPT

## 2023-06-17 PROCEDURE — 85610 PROTHROMBIN TIME: CPT | Performed by: EMERGENCY MEDICINE

## 2023-06-17 PROCEDURE — 84484 ASSAY OF TROPONIN QUANT: CPT | Performed by: EMERGENCY MEDICINE

## 2023-06-17 PROCEDURE — 99285 EMERGENCY DEPT VISIT HI MDM: CPT | Mod: 25

## 2023-06-17 RX ORDER — IOPAMIDOL 755 MG/ML
75 INJECTION, SOLUTION INTRAVASCULAR ONCE
Status: COMPLETED | OUTPATIENT
Start: 2023-06-17 | End: 2023-06-17

## 2023-06-17 RX ORDER — GADOBUTROL 604.72 MG/ML
6 INJECTION INTRAVENOUS ONCE
Status: COMPLETED | OUTPATIENT
Start: 2023-06-17 | End: 2023-06-17

## 2023-06-17 RX ADMIN — IOPAMIDOL 75 ML: 755 INJECTION, SOLUTION INTRAVENOUS at 13:26

## 2023-06-17 RX ADMIN — GADOBUTROL 6 ML: 604.72 INJECTION INTRAVENOUS at 14:59

## 2023-06-17 RX ADMIN — SODIUM CHLORIDE 90 ML: 9 INJECTION, SOLUTION INTRAVENOUS at 13:26

## 2023-06-17 ASSESSMENT — ACTIVITIES OF DAILY LIVING (ADL): ADLS_ACUITY_SCORE: 35

## 2023-06-17 NOTE — ED PROVIDER NOTES
Visit Date: 06/17/2023    REVISED REPORT    CHIEF COMPLAINT:  Acute confusion.    HISTORY:  This is a 67-year-old female that is brought in by her  who is an orthopedist on staff here because of acute confusion.  The patient had been doing fine this morning, had been water skiing and had no trauma and did well, but soon afterwards began to seem confused, confusion about things they had discussed earlier today, about what she was doing and her  being concerned for stroke, brought her directly to the ED.  The patient does have a significant history of rheumatoid arthritis, is on many medications.  No history of strokes.  She has had a coronary angiogram in the past.    CURRENT MEDICATIONS:  Tessalon, esterase, Protonix, prednisone, Crestor, Humira, aspirin, Plaquenil, methotrexate.    FAMILY AND SOCIAL HISTORY:  , .  No significant alcohol use.    REVIEW OF SYSTEMS:  As noted with all other systems negative.    PHYSICAL EXAM:  Reveals a white female, supine, no respiratory distress.  Blood pressure 152/77, temp 98, pulse 80, respirations 16, pulse ox 99 on room air.  Head and neck exam:  Pupils equal, reactive.  Extraocular movements intact.  Nares and oropharynx clear.  Neck supple.  No bruits.  Lungs clear.  Heart regular.  No murmurs.  Abdomen soft, no tenderness, masses or hepatosplenomegaly.  Musculoskeletal:  No swelling or tenderness.  Skin:  No rash.  Neuro:  Awake, alert, appropriate. Oriented x 3  Fluent speech.  Normal motor sensation and coordination.  The patient did have some confusion, however, about recent events.  Motor is 5/5 throughout.  Sensation is intact to light touch.  Finger-nose-finger is good.  Visual fields intact by confrontation.    EMERGENCY DEPARTMENT COURSE:  Code stroke was called.  She went for CT, CTA without any acute findings.  Labs were obtained.  Chemistries:  CO2 of 21, glucose 110.  Troponin normal.  PTT, INR normal.  White count 7.7, hemoglobin  13.2, platelet is 304,000.  Neuro came and saw the patient and recommended an MRI.  This was obtained and was read by neuro radiology as no acute intracranial process.  Scattered nonspecific FLAIR areas are seen.  I did speak again with stroke neurology and they recommended discharge with Neurology followup.  I discussed this with the patient and her  and I have also recommended they follow up with her primary and even contact Rheumatology and follow up with him.  The patient will be discharged home.      IMpression:   mild transient global amnesia.  Cannot rule out microvascular disease or even a petit mal seizure.      The patient will follow up for further evaluation.  If recurrent symptoms occur, return to the ED.    ADDENDUM: EKG was obtained during her visit showing a normal sinus rhythm with left axis deviation, possible left atrial enlargement. Rate 72, , QRS 84 and QTc 464.    Arya Ferrera MD        D: 2023   T: 2023   MT: [email or web address]    Name:     BOO BONE  MRN:      [id number]        Account:    [id number]   :      1955           Visit Date: 2023     Document: [id number]

## 2023-06-17 NOTE — PROGRESS NOTES
MRI came back negative for ischemic stroke. Showing evidence of periventricular and subcortical white matter hyper intensities.     Plan:  Outpatient neurology clinic follow up.

## 2023-06-17 NOTE — ED TRIAGE NOTES
Pt had confusion started at 1230 today   Pt had just finished waterskining   Pt then started not to remember common things

## 2023-06-17 NOTE — CONSULTS
"[facility]    Stroke Consult Note    Reason for Consult: Stroke Code     Chief Complaint: Altered Mental Status      HPI  Dione Wilson is a 67 year old female with known medical history of HLD, RA. Patient was waterskiing with family this morning till noon. At noon time, she was noted by  to have confusion as she was asking same questions again and again. Her initial presenting BP was in 150s. On arrival and evaluation in ED, NIHSS was 1 only significant for right mild facial droop with no dysarthria present. CT imaging done was reassuring.     Imaging Findings  HEAD CT:  1. Senescent changes and sequelae of chronic microangiopathy without acute intracranial abnormality.     HEAD CTA:  1. No evidence of vascular occlusion or intracranial aneurysm.     NECK CTA:  1. No evidence of vascular occlusion or dissection.      2. Irregular enhancement involving the right thyroid lobe, which is nonspecific but may represent sequelae of thyroiditis.    Intravenous Thrombolysis  Not given due to:   - minor/isolated/quickly resolving symptoms    Endovascular Treatment  Not initiated due to absence of proximal vessel occlusion    Impression   Sudden onset transient altered mental status, resolved  Evidence of right facial droop    Differential diagnosis: TGA, ischemic stroke, TIA    With evidence of right facial droop that is slight and non disabeling, plan to get MRI to rule out ischemic stroke. Not a candidate for lytic treatment with no disabling deficits.    Recommendations  Please obtain brain MRI WWOUT contrast    Thank you for this consult.      The Stroke Staff is Dr. Gonsalves.    Patrizia Torres MD  Vascular Neurology Fellow    To page me or covering stroke neurology team member, click here: AMCOM  Choose \"On Call\" tab at top, then select \"NEUROLOGY/ALL SITES\" from middle drop-down box, press Enter, then look for \"stroke\" or \"telestroke\" for your " site.    ______________________________________________________       Past Medical History   Past Medical History:   Diagnosis Date     Coronary artery disease involving native coronary artery of native heart without angina pectoris     cardiac cath 12/8/2020: mild non-obstructive disease     Daily headache      Recurrent cold sores      Rheumatoid arthritis, rheumatoid factor status unknown (H)      Past Surgical History   Past Surgical History:   Procedure Laterality Date     COLONOSCOPY       COLONOSCOPY N/A 12/6/2016    Procedure: COLONOSCOPY;  Surgeon: Philip Santiago MD;  Location:  GI     COLPORRHAPHY N/A 4/7/2015    Procedure: UTEROSACRAL LIGAMENT SUSPENSION PLICATION,  ANTERIOR POSTERIOR POSSIBLE REPAIR;  Surgeon: Diana Casper MD;  Location: [facility];  Service:      CV LEFT HEART CATH N/A 12/8/2020    Procedure: Left Heart Cath;  Surgeon: Aleksander López MD;  Location:  HEART CARDIAC CATH LAB     CV LEFT VENTRICULOGRAM N/A 12/8/2020    Procedure: Left Ventriculogram;  Surgeon: Aleksander López MD;  Location:  HEART CARDIAC CATH LAB     DILATION AND CURETTAGE       ENT SURGERY      T&A     GYN SURGERY      vag hyst     PELVIC LAPAROSCOPY       KY VAGINAL HYSTERECTOMY,UTERUS 250 GMS/< N/A 4/7/2015    Procedure: VAGINAL HYSTERECTOMY RIGHT SALPINGO O-OPHERECTOMY POSTERIOR REPAIR UTEROSACRAL LIGAMENT SUSPENSION CYSTOSCOPY TRANSVAGINAL TAPE OBTURATOR;  Surgeon: Diana Casper MD;  Location: [facility];  Service: Gynecology     TONSILLECTOMY       Medications   Home Meds  Prior to Admission medications    Medication Sig Start Date End Date Taking? Authorizing Provider   adalimumab (HUMIRA *CF*) 40 MG/0.4ML pen kit Inject 0.4 mLs (40 mg) Subcutaneous every 14 days 1/13/23   Rani Callahan MD   aspirin 81 MG EC tablet Take 81 mg by mouth daily    Reported, Patient   benzonatate (TESSALON) 100 MG capsule Take 1 capsule (100 mg) by mouth 3 times daily as  needed for cough 1/13/23   Rani Callahan MD   Calcium 250 MG CAPS Take 1,000 mg by mouth daily    Reported, Patient   estradiol (ESTRACE) 1 MG tablet Take 0.5 tablets (0.5 mg) by mouth every other day 1/13/23   Rani Callahan MD   folic acid (FOLVITE) 1 MG tablet Take 2 mg by mouth daily    Reported, Patient   hydroxychloroquine (PLAQUENIL) 200 MG tablet Take 1 tablet (200 mg) by mouth daily Takes 1 tab in am, 1/2 tab in pm 2/10/21   Marilyn Espinosa PA-C   methotrexate 2.5 MG tablet Take 6 tablets (15 mg) by mouth once a week 1/10/22   Rani Callahan MD   nitroGLYcerin (NITROSTAT) 0.4 MG sublingual tablet For chest pain place 1 tablet under the tongue every 5 minutes for 3 doses. If symptoms persist 5 minutes after 1st dose call 911. 12/7/20   Aleksander López MD   pantoprazole (PROTONIX) 40 MG EC tablet Take 1 tablet (40 mg) by mouth daily 1/13/23   Rani Callahan MD   predniSONE (DELTASONE) 20 MG tablet Take 2 tablets (40 mg) by mouth daily 1/13/23   Rani Callahan MD   rosuvastatin (CRESTOR) 10 MG tablet TAKE 1 TABLET(10 MG) BY MOUTH DAILY 2/28/23   Rani Callahan MD   vitamin D3 (CHOLECALCIFEROL) 50 mcg (2000 units) tablet Take 1 tablet (50 mcg) by mouth every other day 1/10/22   Rani Callahan MD       Scheduled Meds      Infusion Meds      PRN Meds      Allergies   Allergies   Allergen Reactions     Minocycline      Daughter and son reaction     Tetracycline      Daughter and son allergy     Family History   Family History   Problem Relation Age of Onset     Peptic Ulcer Disease Mother      Hypertension Mother      Diabetes Father      Cancer Father         biliary     Crohn's Disease Brother      Coronary Artery Disease Brother 60     Heart Disease Brother      Breast Cancer Maternal Aunt      Social History   Social History     Tobacco Use     Smoking status: Never     Smokeless tobacco: Never   Vaping Use     Vaping status: Never Used   Substance Use Topics     Alcohol use: Yes     Comment:  2-4 per week     Drug use: No       Review of Systems   The 10 point Review of Systems is negative other than noted in the HPI or here.        PHYSICAL EXAMINATION  Temp:  [98  F (36.7  C)] 98  F (36.7  C)  Pulse:  [78] 78  Resp:  [13-16] 13  BP: (152)/(77) 152/77  SpO2:  [98 %-99 %] 98 %     Neurologic  Mental Status:  alert, oriented x 3, follows commands, speech clear and fluent, naming and repetition normal  Cranial Nerves:  Mild right facial doop seen, otherwise intact CN exam  Motor:  normal muscle tone and bulk, no abnormal movements, able to move all limbs spontaneously, strength 5/5 throughout upper and lower extremities, no pronator drift  Reflexes:  toes down-going  Sensory:  light touch sensation intact and symmetric throughout upper and lower extremities, no extinction on double simultaneous stimulation   Coordination:  normal finger-to-nose and heel-to-shin bilaterally without dysmetria, rapid alternating movements symmetric  Station/Gait:  deferred    Dysphagia Screen  Per Nursing    Stroke Scales    NIHSS  1a. Level of Consciousness 0-->Alert, keenly responsive   1b. LOC Questions 0-->Answers both questions correctly   1c. LOC Commands 0-->Performs both tasks correctly   2.   Best Gaze 0-->Normal   3.   Visual 0-->No visual loss   4.   Facial Palsy 1-->Minor paralysis (flattened nasolabial fold, asymmetry on smiling)   5a. Motor Arm, Left 0-->No drift, limb holds 90 (or 45) degrees for full 10 secs   5b. Motor Arm, Right 0-->No drift, limb holds 90 (or 45) degrees for full 10 secs   6a. Motor Leg, Left 0-->No drift, leg holds 30 degree position for full 5 secs   6b. Motor Leg, right 0-->No drift, leg holds 30 degree position for full 5 secs   7.   Limb Ataxia 0-->Absent   8.   Sensory 0-->Normal, no sensory loss   9.   Best Language 0-->No aphasia, normal   10. Dysarthria 0-->Normal   11. Extinction and Inattention  0-->No abnormality   Total 1 (06/17/23 1347)           Imaging  I personally reviewed  all imaging; relevant findings per HPI.     Lab Results Data   CBC  Recent Labs   Lab 06/17/23  1312   WBC 7.7   RBC 4.26   HGB 13.2   HCT 38.7        Basic Metabolic Panel    Recent Labs   Lab 06/17/23  1312      POTASSIUM 4.2   CHLORIDE 105   CO2 21*   BUN 13.9   CR 0.57   *   BRANDIE 9.1     Liver Panel  No results for input(s): PROTTOTAL, ALBUMIN, BILITOTAL, ALKPHOS, AST, ALT, BILIDIRECT in the last 168 hours.  INR    Recent Labs   Lab Test 06/17/23  1312 12/08/20  0930   INR 1.01 1.05      Lipid Profile    Recent Labs   Lab Test 01/12/23  1241 01/10/22  1003 01/08/21  0943   CHOL 198 187 158   HDL 96 94 99   LDL 89 85 51   TRIG 66 40 42     A1C    Recent Labs   Lab Test 01/21/19  0000   A1C 5.5     Troponin    Recent Labs   Lab 06/17/23  1312   CTROPT <6          Stroke Code Data Data   Stroke Code Data  (for stroke code without tele)  Stroke code activated 06/17/23   1313   First stroke provider response 06/17/23   1315   Last known normal 06/17/23   1200   Time of discovery   (or onset of symptoms) 06/17/23   1200   Head CT read by Stroke Neuro Dr/Provider 06/17/23   1327   Was stroke code de-escalated? Yes 06/17/23 3558

## 2023-06-22 NOTE — PROGRESS NOTES
Assessment & Plan     Transient global amnesia  I reviewed the whole history, MRI with the patient  I discussed about the pathogenesis  I discussed about microvascular disease and cerebral atrophy noted  We will start thiamine especially on the days that she has alcohol  We will check the following labs  - REVIEW OF HEALTH MAINTENANCE PROTOCOL ORDERS  - TSH with free T4 reflex  - Vitamin B12  - Vitamin B1 whole blood  - thiamine 50 MG TABS  Dispense: 31 tablet; Refill: 3  - TSH with free T4 reflex  - Vitamin B12  - Vitamin B1 whole blood    Rheumatoid arthritis, rheumatoid factor status unknown (H)  Patient will continue methotrexate, Plaquenil, Humira and folic acid  - REVIEW OF HEALTH MAINTENANCE PROTOCOL ORDERS  - CRP inflammation  - CRP inflammation    Coronary artery disease involving native coronary artery of native heart with angina pectoris (H)  Moderate disease and no angina at present  - REVIEW OF HEALTH MAINTENANCE PROTOCOL ORDERS    Hyperlipidemia LDL goal <100  Continue rosuvastatin and baby aspirin  - Lipid panel reflex to direct LDL Fasting  - Lipid panel reflex to direct LDL Fasting    Abnormal glucose tolerance test  Statins increase the risk of diabetes also  - Hemoglobin A1c  - Glucose  - Hemoglobin A1c  - Glucose             MED REC REQUIRED  Post Medication Reconciliation Status: discharge medications reconciled and changed, per note/orders      Rani Callahan MD  [facility]   Dione is a 67 year old, presenting for the following health issues:  ER F/U         No data to display              HPI this extremely pleasant 67 years old has rheumatoid arthritis  Since my last visit with her  She was in the emergency room at [facility] on 6/17/2023-report of Dr. Duncan and neurology were reviewed.    [facility]. She recalls being very happy that she had gotten up on 1 ski. She had difficulty getting back into  the boat and recalls being very happy and wanting to let her family members know. Since shortly after, she was noted to be confused and her conversation with family starting to asked the same question repeatedly. After that she remembers bits and pieces of what happened such as her  asking her to change out of her bathing suit to go the emergency room. She does not remember getting a CT scan. She recalls asking one of the people living in her guest house a question which was confused about who live there.  These difficulties gradually improved but took about 2 to 3 hours to resolve per reports of her family.  She did feel tired the day afterwards but otherwise has not noticed any other problems with memory.   She was seen by neurology and all the work-up was negative  She has felt good since  Symptoms were needed at present before no record  On direct questions she has 2 alcoholic drinks 4 times a week  ED/UC Followup:    Facility:  [facility] Emergency Dept   Date of visit: 6/17/2023  Reason for visit:     Transient global amnesia    Current Status: Improved        EMERGENCY DEPARTMENT COURSE:  Code stroke was called.  She went for CT, CTA without any acute findings.  Labs were obtained.  Chemistries:  CO2 of 21, glucose 110.  Troponin normal.  PTT, INR normal.  White count 7.7, hemoglobin 13.2, platelet is 304,000.  Neuro came and saw the patient and recommended an MRI.  This was obtained and was read by neuro radiology as no acute intracranial process.  Scattered nonspecific FLAIR areas are seen    Review of Systems   10 point ROS of systems including Constitutional, Eyes, Respiratory, Cardiovascular, Gastroenterology, Genitourinary, Integumentary, Muscularskeletal, Psychiatric were all negative except for pertinent positives noted in my HPI.        Objective    /67 (BP Location: Left arm, Patient Position: Sitting, Cuff Size: Adult Regular)   Pulse 65   Temp 97.8  F (36.6  C)  "(Temporal)   Resp 16   Ht 1.549 m (5' 1\")   Wt 55.6 kg (122 lb 9.6 oz)   SpO2 97%   BMI 23.17 kg/m    Body mass index is 23.17 kg/m .  Physical Exam   GENERAL: healthy, alert and no distress  NECK: no adenopathy, no asymmetry, masses, or scars and thyroid normal to palpation  RESP: lungs clear to auscultation - no rales, rhonchi or wheezes  CV: regular rate and rhythm, normal S1 S2, no S3 or S4, no murmur, click or rub, no peripheral edema and peripheral pulses strong  ABDOMEN: soft, nontender, no hepatosplenomegaly, no masses and bowel sounds normal  MS: no gross musculoskeletal defects noted, no edema  NEURO: Normal strength and tone, mentation intact and speech normal    Patient Active Problem List   Diagnosis     CARDIOVASCULAR SCREENING; LDL GOAL LESS THAN 160     Other forms of angina pectoris (H)     Status post coronary angiogram     Coronary artery disease involving native coronary artery of native heart without angina pectoris     Rheumatoid arthritis, rheumatoid factor status unknown (H)     Recurrent cold sores     Daily headache     Family hx colonic polyps     Current Outpatient Medications   Medication     adalimumab (HUMIRA *CF*) 40 MG/0.4ML pen kit     aspirin 81 MG EC tablet     Calcium 250 MG CAPS     estradiol (ESTRACE) 1 MG tablet     folic acid (FOLVITE) 1 MG tablet     hydroxychloroquine (PLAQUENIL) 200 MG tablet     methotrexate 2.5 MG tablet     thiamine 50 MG TABS     vitamin D3 (CHOLECALCIFEROL) 50 mcg (2000 units) tablet     rosuvastatin (CRESTOR) 10 MG tablet     No current facility-administered medications for this visit.     Admission on 06/17/2023, Discharged on 06/17/2023   Component Date Value Ref Range Status     Sodium 06/17/2023 139  136 - 145 mmol/L Final     Potassium 06/17/2023 4.2  3.4 - 5.3 mmol/L Final     Chloride 06/17/2023 105  98 - 107 mmol/L Final     Carbon Dioxide (CO2) 06/17/2023 21 (L)  22 - 29 mmol/L Final     Anion Gap 06/17/2023 13  7 - 15 mmol/L Final     " Urea Nitrogen 06/17/2023 13.9  8.0 - 23.0 mg/dL Final     Creatinine 06/17/2023 0.57  0.51 - 0.95 mg/dL Final     Calcium 06/17/2023 9.1  8.8 - 10.2 mg/dL Final     Glucose 06/17/2023 110 (H)  70 - 99 mg/dL Final     GFR Estimate 06/17/2023 >90  >60 mL/min/1.73m2 Final    eGFR calculated using 2021 CKD-EPI equation.     INR 06/17/2023 1.01  0.85 - 1.15 Final     aPTT 06/17/2023 34  22 - 38 Seconds Final     Troponin T, High Sensitivity 06/17/2023 <6  <=14 ng/L Final    Either a High Sensitivity Troponin T baseline (0 hours) value = 100 ng/L, or an increase in High Sensitivity Troponin T = 7 ng/L at 2 hours compared to 0 hours (2-0 hours), suggests myocardial injury, and urgent clinical attention is required.    If the 2-0 hours increase is <7 ng/L, a High Sensitivity Troponin T result above gender-specific reference ranges warrants further evaluation.   Recommendations for further evaluation include correlation with clinical decision-making tool (e.g., HEART), a 3rd High Sensitivity Troponin T test 2 hours after the 2nd (a 20% change from baseline would represent concern), admission for observation, close PCC/cardiology follow-up, or urgent outpatient provocative testing.     Ventricular Rate 06/17/2023 72  BPM Final     Atrial Rate 06/17/2023 72  BPM Final     FL Interval 06/17/2023 146  ms Final     QRS Duration 06/17/2023 84  ms Final     QT 06/17/2023 424  ms Final     QTc 06/17/2023 464  ms Final     P Axis 06/17/2023 70  degrees Final     R AXIS 06/17/2023 -54  degrees Final     T Axis 06/17/2023 35  degrees Final     Interpretation ECG 06/17/2023    Final                    Value:Sinus rhythm  Possible Left atrial enlargement  Left axis deviation  Low voltage QRS  T wave abnormality, consider anterior ischemia  Abnormal ECG  When compared with ECG of 08-DEC-2020 10:07,  No significant change was found  Confirmed by GENERATED REPORT, COMPUTER ([id number]),  Rula Salazar ([id number]) on 6/17/2023 3:38:33 PM        WBC Count 06/17/2023 7.7  4.0 - 11.0 10e3/uL Final     RBC Count 06/17/2023 4.26  3.80 - 5.20 10e6/uL Final     Hemoglobin 06/17/2023 13.2  11.7 - 15.7 g/dL Final     Hematocrit 06/17/2023 38.7  35.0 - 47.0 % Final     MCV 06/17/2023 91  78 - 100 fL Final     MCH 06/17/2023 31.0  26.5 - 33.0 pg Final     MCHC 06/17/2023 34.1  31.5 - 36.5 g/dL Final     RDW 06/17/2023 13.7  10.0 - 15.0 % Final     Platelet Count 06/17/2023 304  150 - 450 10e3/uL Final     % Neutrophils 06/17/2023 57  % Final     % Lymphocytes 06/17/2023 32  % Final     % Monocytes 06/17/2023 8  % Final     % Eosinophils 06/17/2023 2  % Final     % Basophils 06/17/2023 1  % Final     % Immature Granulocytes 06/17/2023 0  % Final     NRBCs per 100 WBC 06/17/2023 0  <1 /100 Final     Absolute Neutrophils 06/17/2023 4.4  1.6 - 8.3 10e3/uL Final     Absolute Lymphocytes 06/17/2023 2.4  0.8 - 5.3 10e3/uL Final     Absolute Monocytes 06/17/2023 0.6  0.0 - 1.3 10e3/uL Final     Absolute Eosinophils 06/17/2023 0.2  0.0 - 0.7 10e3/uL Final     Absolute Basophils 06/17/2023 0.1  0.0 - 0.2 10e3/uL Final     Absolute Immature Granulocytes 06/17/2023 0.0  <=0.4 10e3/uL Final     Absolute NRBCs 06/17/2023 0.0  10e3/uL Final   Senescent changes and sequelae of chronic microangiopathy without acute intracranial abnormality.     HEAD CTA:  1. No evidence of vascular occlusion or intracranial aneurysm.     NECK CTA:  1. No evidence of vascular occlusion or dissection.      2. Irregular enhancement involving the right thyroid lobe, which is nonspecific but may represent sequelae of thyroiditis.  1.  No acute intracranial process.  2.  Generalized brain atrophy and presumed microvascular ischemic changes as detailed above.

## 2023-06-26 ENCOUNTER — OFFICE VISIT (OUTPATIENT)
Dept: FAMILY MEDICINE | Facility: CLINIC | Age: 68
End: 2023-06-26
Payer: COMMERCIAL

## 2023-06-26 VITALS
RESPIRATION RATE: 16 BRPM | DIASTOLIC BLOOD PRESSURE: 67 MMHG | TEMPERATURE: 97.8 F | HEIGHT: 61 IN | OXYGEN SATURATION: 97 % | HEART RATE: 65 BPM | WEIGHT: 122.6 LBS | SYSTOLIC BLOOD PRESSURE: 104 MMHG | BODY MASS INDEX: 23.15 KG/M2

## 2023-06-26 DIAGNOSIS — E78.5 HYPERLIPIDEMIA LDL GOAL <100: ICD-10-CM

## 2023-06-26 DIAGNOSIS — I25.119 CORONARY ARTERY DISEASE INVOLVING NATIVE CORONARY ARTERY OF NATIVE HEART WITH ANGINA PECTORIS (H): ICD-10-CM

## 2023-06-26 DIAGNOSIS — R73.09 ABNORMAL GLUCOSE TOLERANCE TEST: ICD-10-CM

## 2023-06-26 DIAGNOSIS — G45.4 TRANSIENT GLOBAL AMNESIA: Primary | ICD-10-CM

## 2023-06-26 DIAGNOSIS — M06.9 RHEUMATOID ARTHRITIS, RHEUMATOID FACTOR STATUS UNKNOWN (H): ICD-10-CM

## 2023-06-26 LAB
CHOLEST SERPL-MCNC: 172 MG/DL
CRP SERPL-MCNC: <3 MG/L
FASTING STATUS PATIENT QL REPORTED: YES
GLUCOSE SERPL-MCNC: 103 MG/DL (ref 70–99)
HBA1C MFR BLD: 5.5 % (ref 0–5.6)
HDLC SERPL-MCNC: 83 MG/DL
LDLC SERPL CALC-MCNC: 77 MG/DL
NONHDLC SERPL-MCNC: 89 MG/DL
TRIGL SERPL-MCNC: 59 MG/DL
TSH SERPL DL<=0.005 MIU/L-ACNC: 1.42 UIU/ML (ref 0.3–4.2)
VIT B12 SERPL-MCNC: 1280 PG/ML (ref 232–1245)

## 2023-06-26 PROCEDURE — 99000 SPECIMEN HANDLING OFFICE-LAB: CPT | Performed by: INTERNAL MEDICINE

## 2023-06-26 PROCEDURE — 36415 COLL VENOUS BLD VENIPUNCTURE: CPT | Performed by: INTERNAL MEDICINE

## 2023-06-26 PROCEDURE — 86140 C-REACTIVE PROTEIN: CPT | Performed by: INTERNAL MEDICINE

## 2023-06-26 PROCEDURE — 84425 ASSAY OF VITAMIN B-1: CPT | Mod: 90 | Performed by: INTERNAL MEDICINE

## 2023-06-26 PROCEDURE — 80061 LIPID PANEL: CPT | Performed by: INTERNAL MEDICINE

## 2023-06-26 PROCEDURE — 82607 VITAMIN B-12: CPT | Performed by: INTERNAL MEDICINE

## 2023-06-26 PROCEDURE — 83036 HEMOGLOBIN GLYCOSYLATED A1C: CPT | Performed by: INTERNAL MEDICINE

## 2023-06-26 PROCEDURE — 82947 ASSAY GLUCOSE BLOOD QUANT: CPT | Performed by: INTERNAL MEDICINE

## 2023-06-26 PROCEDURE — 84443 ASSAY THYROID STIM HORMONE: CPT | Performed by: INTERNAL MEDICINE

## 2023-06-26 PROCEDURE — 99214 OFFICE O/P EST MOD 30 MIN: CPT | Performed by: INTERNAL MEDICINE

## 2023-06-26 ASSESSMENT — PAIN SCALES - GENERAL: PAINLEVEL: NO PAIN (1)

## 2023-06-29 LAB — VIT B1 PYROPHOSHATE BLD-SCNC: 119 NMOL/L

## 2023-07-20 ENCOUNTER — MYC MEDICAL ADVICE (OUTPATIENT)
Dept: FAMILY MEDICINE | Facility: CLINIC | Age: 68
End: 2023-07-20
Payer: COMMERCIAL

## 2023-07-20 DIAGNOSIS — E78.5 HYPERLIPIDEMIA LDL GOAL <100: ICD-10-CM

## 2023-07-20 RX ORDER — ROSUVASTATIN CALCIUM 10 MG/1
10 TABLET, COATED ORAL DAILY
Qty: 90 TABLET | Refills: 1 | Status: SHIPPED | OUTPATIENT
Start: 2023-07-20 | End: 2024-07-31 | Stop reason: DRUGHIGH

## 2023-08-03 DIAGNOSIS — G45.4 TRANSIENT GLOBAL AMNESIA: ICD-10-CM

## 2023-08-03 RX ORDER — THIAMINE HCL 50 MG
50 TABLET ORAL DAILY
Qty: 31 TABLET | Refills: 3 | Status: SHIPPED | OUTPATIENT
Start: 2023-08-03 | End: 2023-11-15

## 2023-08-29 ENCOUNTER — TRANSFERRED RECORDS (OUTPATIENT)
Dept: HEALTH INFORMATION MANAGEMENT | Facility: CLINIC | Age: 68
End: 2023-08-29
Payer: COMMERCIAL

## 2023-08-29 LAB
ALT SERPL-CCNC: 15 IU/L (ref 5–35)
AST SERPL-CCNC: 23 U/L (ref 5–34)
CREATININE (EXTERNAL): 0.72 MG/DL (ref 0.5–1.3)
GFR ESTIMATED (EXTERNAL): 86.1 ML/MIN/1.73M2

## 2023-09-05 ENCOUNTER — HOSPITAL ENCOUNTER (OUTPATIENT)
Dept: MAMMOGRAPHY | Facility: CLINIC | Age: 68
Discharge: HOME | End: 2023-09-05
Attending: INTERNAL MEDICINE | Admitting: INTERNAL MEDICINE
Payer: COMMERCIAL

## 2023-09-05 DIAGNOSIS — Z12.31 VISIT FOR SCREENING MAMMOGRAM: ICD-10-CM

## 2023-09-05 PROCEDURE — 77067 SCR MAMMO BI INCL CAD: CPT

## 2023-09-29 NOTE — PROGRESS NOTES
"SUBJECTIVE:   Dione is a 67 year old who presents for Preventive Visit.    This extremely nice very active 67 years old patient has rheumatoid arthritis  She has positive rheumatoid factor and sees rheumatology and is on methotrexate Humira and Plaquenil  Other than jaw pain, she is overall quite stable  There is intermittent hand pain  She recently had a laceration to that but is up-to-date on Tdap  Status had to be placed by her   Her headaches are stable  She does not have any chest pain or shortness of breath  She recently returned from [place]  She wore a mask and also took vaccine prior to travel  She has seen neurology and has no recurrence of transient global amnesia  She wishes to wait on colonoscopy for 10 years  Her father had colon polyps  Are you in the first 12 months of your Medicare coverage?  No    Healthy Habits:     In general, how would you rate your overall health?  Good    Frequency of exercise:  2-3 days/week    Duration of exercise:  45-60 minutes    Do you usually eat at least 4 servings of fruit and vegetables a day, include whole grains    & fiber and avoid regularly eating high fat or \"junk\" foods?  Yes    Taking medications regularly:  Yes    Medication side effects:  Other    Ability to successfully perform activities of daily living:  No assistance needed    Home Safety:  Throw rugs in the hallway and lack of grab bars in the bathroom    Hearing Impairment:  Need to ask people to speak up or repeat themselves    In the past 6 months, have you been bothered by leaking of urine?  No    In general, how would you rate your overall mental or emotional health?  Excellent    Additional concerns today:  No          Have you ever done Advance Care Planning? (For example, a Health Directive, POLST, or a discussion with a medical provider or your loved ones about your wishes): No, advance care planning information given to patient to review.  Patient plans to discuss their wishes with " loved ones or provider.         Fall risk  Fallen 2 or more times in the past year?: No  Any fall with injury in the past year?: No    Cognitive Screening   1) Repeat 3 items (Leader, Season, Table)    2) Clock draw: NORMAL  3) 3 item recall: Recalls 3 objects  Results: 3 items recalled: COGNITIVE IMPAIRMENT LESS LIKELY    Mini-CogTM Copyright CATIA Bowling. Licensed by the author for use in [facility]; reprinted with permission (kat@[facility]). All rights reserved.      Do you have sleep apnea, excessive snoring or daytime drowsiness? : no    Reviewed and updated as needed this visit by clinical staff   Tobacco  Allergies  Meds  Problems             Reviewed and updated as needed this visit by Provider    Allergies  Meds  Problems            Social History     Tobacco Use    Smoking status: Never    Smokeless tobacco: Never   Substance Use Topics    Alcohol use: Yes     Comment: 2-4 per week             10/2/2023     1:47 PM   Alcohol Use   Prescreen: >3 drinks/day or >7 drinks/week? No     Do you have a current opioid prescription? No  Do you use any other controlled substances or medications that are not prescribed by a provider? Alcohol              Current providers sharing in care for this patient include:   Patient Care Team:  Rani Callahan MD as PCP - General (Internal Medicine)  Sara Stack MD as MD (Rheumatology)  Rani Callahan MD as Assigned PCP    The following health maintenance items are reviewed in Epic and correct as of today:  Health Maintenance   Topic Date Due    COVID-19 Vaccine (9 - 2023-24 season) 11/15/2023    CMP  01/12/2024    CBC  06/17/2024    LIPID  06/26/2024    ANNUAL REVIEW OF HM ORDERS  06/26/2024    MEDICARE ANNUAL WELLNESS VISIT  10/02/2024    FALL RISK ASSESSMENT  10/02/2024    PAP  02/01/2025    MAMMO SCREENING  09/05/2025    COLORECTAL CANCER SCREENING  12/06/2026    ADVANCE CARE PLANNING  10/02/2028    DTAP/TDAP/TD IMMUNIZATION (3 - Td or Tdap)  02/20/2030    DEXA  11/29/2031    PHQ-2 (once per calendar year)  Completed    INFLUENZA VACCINE  Completed    Pneumococcal Vaccine: 65+ Years  Completed    ZOSTER IMMUNIZATION  Completed    IPV IMMUNIZATION  Aged Out    HPV IMMUNIZATION  Aged Out    MENINGITIS IMMUNIZATION  Aged Out    HEPATITIS C SCREENING  Discontinued     BP Readings from Last 3 Encounters:   10/02/23 105/72   06/26/23 104/67   06/17/23 120/69    Wt Readings from Last 3 Encounters:   10/02/23 55.9 kg (123 lb 3.2 oz)   06/26/23 55.6 kg (122 lb 9.6 oz)   06/17/23 61.2 kg (135 lb)                  Patient Active Problem List   Diagnosis    CARDIOVASCULAR SCREENING; LDL GOAL LESS THAN 160    Other forms of angina pectoris    Status post coronary angiogram    Coronary artery disease involving native coronary artery of native heart without angina pectoris    Rheumatoid arthritis, rheumatoid factor status unknown (H)    Recurrent cold sores    Daily headache    Family hx colonic polyps     Past Surgical History:   Procedure Laterality Date    COLONOSCOPY      COLONOSCOPY N/A 12/6/2016    Procedure: COLONOSCOPY;  Surgeon: Philip Santiago MD;  Location:  GI    COLPORRHAPHY N/A 4/7/2015    Procedure: UTEROSACRAL LIGAMENT SUSPENSION PLICATION,  ANTERIOR POSTERIOR POSSIBLE REPAIR;  Surgeon: Diana Casper MD;  Location: [facility];  Service:     CV LEFT HEART CATH N/A 12/8/2020    Procedure: Left Heart Cath;  Surgeon: Aleksander López MD;  Location:  HEART CARDIAC CATH LAB    CV LEFT VENTRICULOGRAM N/A 12/8/2020    Procedure: Left Ventriculogram;  Surgeon: Aleksander López MD;  Location:  HEART CARDIAC CATH LAB    DILATION AND CURETTAGE      ENT SURGERY      T&A    GYN SURGERY      vag hyst    PELVIC LAPAROSCOPY      TX VAGINAL HYSTERECTOMY,UTERUS 250 GMS/< N/A 4/7/2015    Procedure: VAGINAL HYSTERECTOMY RIGHT SALPINGO O-OPHERECTOMY POSTERIOR REPAIR UTEROSACRAL LIGAMENT SUSPENSION CYSTOSCOPY TRANSVAGINAL TAPE  OBTURATOR;  Surgeon: Diana Casper MD;  Location: [facility];  Service: Gynecology    TONSILLECTOMY         Social History     Tobacco Use    Smoking status: Never    Smokeless tobacco: Never   Substance Use Topics    Alcohol use: Yes     Comment: 2-4 per week     Family History   Problem Relation Age of Onset    Peptic Ulcer Disease Mother     Hypertension Mother     Diabetes Father     Cancer Father         biliary    Crohn's Disease Brother     Coronary Artery Disease Brother 60    Heart Disease Brother     Breast Cancer Maternal Aunt          Current Outpatient Medications   Medication Sig Dispense Refill    Acetaminophen (TYLENOL 8 HOUR PO)       adalimumab (HUMIRA *CF*) 40 MG/0.4ML pen kit Inject 0.4 mLs (40 mg) Subcutaneous every 14 days      aspirin 81 MG EC tablet Take 81 mg by mouth daily      Calcium 250 MG CAPS Take 2,000 mg by mouth daily      estradiol (ESTRACE) 0.5 MG tablet Take 0.5 mg by mouth daily      folic acid (FOLVITE) 1 MG tablet Take 2 mg by mouth daily 4 tabs per day - 2 in am and 2 in evening      hydroxychloroquine (PLAQUENIL) 200 MG tablet Take 1 tablet (200 mg) by mouth daily Takes 1 tab in am, 1/2 tab in pm      Ibuprofen (ADVIL PO)       methotrexate 2.5 MG tablet Take 6 tablets (15 mg) by mouth once a week      rosuvastatin (CRESTOR) 10 MG tablet Take 1 tablet (10 mg) by mouth daily (Patient taking differently: Take 5 mg by mouth daily Taking half tab) 90 tablet 1    vitamin B1 (THIAMINE) 50 MG tablet TAKE 1 TABLET BY MOUTH EVERY DAY 31 tablet 3    vitamin D3 (CHOLECALCIFEROL) 50 mcg (2000 units) tablet Take 1 tablet by mouth three times a week       Allergies   Allergen Reactions    Minocycline      Daughter and son reaction    Tetracycline      Daughter and son allergy       Any new diagnosis of family breast, ovarian, or bowel cancer? No    FHS-7:       8/2/2021     3:56 PM 1/10/2022     9:16 AM 9/5/2023     9:46 AM   Breast CA Risk Assessment (FHS-7)   Did any  "of your first-degree relatives have breast or ovarian cancer? No No No   Did any of your relatives have bilateral breast cancer? No No No   Did any man in your family have breast cancer? No No No   Did any woman in your family have breast and ovarian cancer? Yes Yes Yes   Did any woman in your family have breast cancer before age 50 y? No No No   Do you have 2 or more relatives with breast and/or ovarian cancer? No No No   Do you have 2 or more relatives with breast and/or bowel cancer? No No No       Mammogram Screening: Recommended annual mammography  Pertinent mammograms are reviewed under the imaging tab.    Review of Systems   Constitutional:  Negative for chills and fever.   HENT:  Negative for congestion, ear pain, hearing loss and sore throat.    Eyes:  Negative for pain and visual disturbance.   Respiratory:  Negative for cough and shortness of breath.    Cardiovascular:  Negative for chest pain, palpitations and peripheral edema.   Gastrointestinal:  Negative for abdominal pain, constipation, diarrhea, heartburn, hematochezia and nausea.   Breasts:  Negative for tenderness, breast mass and discharge.   Genitourinary:  Positive for frequency. Negative for dysuria, genital sores, hematuria, pelvic pain, urgency, vaginal bleeding and vaginal discharge.   Musculoskeletal:  Positive for arthralgias, joint swelling and myalgias.   Skin:  Negative for rash.   Neurological:  Negative for dizziness, weakness, headaches and paresthesias.   Psychiatric/Behavioral:  Negative for mood changes. The patient is not nervous/anxious.          OBJECTIVE:   /72 (BP Location: Left arm, Patient Position: Sitting, Cuff Size: Adult Regular)   Pulse 74   Temp 98  F (36.7  C) (Temporal)   Resp 12   Ht 1.531 m (5' 0.28\")   Wt 55.9 kg (123 lb 3.2 oz)   SpO2 98%   BMI 23.84 kg/m   Estimated body mass index is 23.84 kg/m  as calculated from the following:    Height as of this encounter: 1.531 m (5' 0.28\").    Weight as of " this encounter: 55.9 kg (123 lb 3.2 oz).  Physical Exam  GENERAL APPEARANCE: healthy, alert and no distress  EYES: Eyes grossly normal to inspection, PERRL and conjunctivae and sclerae normal  HENT: ear canals and TM's normal, nose and mouth without ulcers or lesions, oropharynx clear and oral mucous membranes moist  NECK: no adenopathy, no asymmetry, masses, or scars and thyroid normal to palpation  RESP: lungs clear to auscultation - no rales, rhonchi or wheezes  BREAST: normal without masses, tenderness or nipple discharge and no palpable axillary masses or adenopathy  CV: regular rate and rhythm, normal S1 S2, no S3 or S4, no murmur, click or rub, no peripheral edema and peripheral pulses strong  ABDOMEN: soft, nontender, no hepatosplenomegaly, no masses and bowel sounds normal  MS: no musculoskeletal defects are noted and gait is age appropriate without ataxia  SKIN: no suspicious lesions or rashes  NEURO: Normal strength and tone, sensory exam grossly normal, mentation intact and speech normal  PSYCH: mentation appears normal and affect normal/bright        ASSESSMENT / PLAN:   Dione was seen today for physical.    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  She is up-to-date on mammogram  Colon exam as below and does not need Pap smear as above patient is in remission and she will try CBD ointment locally on her hands  Rheumatoid arthritis, rheumatoid factor status unknown (H)    Coronary artery disease involving native coronary artery of native heart without angina pectoris this was incidentally found when she was being worked up for jaw pain  This is not the cause of her jaw pain which was rheumatoid arthritis  She is stable at this point without any symptoms and will take baby aspirin and statins    Hyperlipidemia LDL goal  <100  Continue rosuvastatin  Transient global amnesia    No recurrence and has been fully worked up  Family hx colonic polyps   I discussed with patient about Cologuard although this has some false positives and negatives it will be a good option instead of not doing colonoscopy  \Patient is agreeable  -     COLOGUARD(EXACT SCIENCES); Future    Special screening for malignant neoplasms, colon  -     COLOGUARD(EXACT SCIENCES); Future              COUNSELING:  Bone density scan next year      She reports that she has never smoked. She has never used smokeless tobacco.      Appropriate preventive services were discussed with this patient, including applicable screening as appropriate for fall prevention, nutrition, physical activity, Tobacco-use cessation, weight loss and cognition.  Checklist reviewing preventive services available has been given to the patient.    Reviewed patients plan of care and provided an AVS. The Complex Care Plan (for patients with higher acuity and needing more deliberate coordination of services) for [place] meets the Care Plan requirement. This Care Plan has been established and reviewed with the Patient.          Rani Callahan MD  [facility]    Identified Health Risks:

## 2023-10-02 ENCOUNTER — LAB (OUTPATIENT)
Dept: FAMILY MEDICINE | Facility: CLINIC | Age: 68
End: 2023-10-02

## 2023-10-02 ENCOUNTER — OFFICE VISIT (OUTPATIENT)
Dept: FAMILY MEDICINE | Facility: CLINIC | Age: 68
End: 2023-10-02
Payer: COMMERCIAL

## 2023-10-02 VITALS
WEIGHT: 123.2 LBS | HEART RATE: 74 BPM | OXYGEN SATURATION: 98 % | HEIGHT: 60 IN | BODY MASS INDEX: 24.19 KG/M2 | SYSTOLIC BLOOD PRESSURE: 105 MMHG | RESPIRATION RATE: 12 BRPM | DIASTOLIC BLOOD PRESSURE: 72 MMHG | TEMPERATURE: 98 F

## 2023-10-02 DIAGNOSIS — Z12.11 SPECIAL SCREENING FOR MALIGNANT NEOPLASMS, COLON: ICD-10-CM

## 2023-10-02 DIAGNOSIS — E78.5 HYPERLIPIDEMIA LDL GOAL <100: ICD-10-CM

## 2023-10-02 DIAGNOSIS — Z83.719 FAMILY HX COLONIC POLYPS: ICD-10-CM

## 2023-10-02 DIAGNOSIS — Z00.00 ENCOUNTER FOR ANNUAL WELLNESS VISIT (AWV) IN MEDICARE PATIENT: Primary | ICD-10-CM

## 2023-10-02 DIAGNOSIS — M06.9 RHEUMATOID ARTHRITIS, RHEUMATOID FACTOR STATUS UNKNOWN (H): ICD-10-CM

## 2023-10-02 DIAGNOSIS — G45.4 TRANSIENT GLOBAL AMNESIA: ICD-10-CM

## 2023-10-02 DIAGNOSIS — I25.10 CORONARY ARTERY DISEASE INVOLVING NATIVE CORONARY ARTERY OF NATIVE HEART WITHOUT ANGINA PECTORIS: ICD-10-CM

## 2023-10-02 PROCEDURE — 99397 PER PM REEVAL EST PAT 65+ YR: CPT | Performed by: INTERNAL MEDICINE

## 2023-10-02 RX ORDER — IBUPROFEN 200 MG
200-400 TABLET ORAL EVERY 4 HOURS PRN
COMMUNITY

## 2023-10-02 RX ORDER — ESTRADIOL 0.5 MG/1
0.5 TABLET ORAL DAILY
COMMUNITY
End: 2023-12-21

## 2023-10-02 ASSESSMENT — ENCOUNTER SYMPTOMS
COUGH: 0
JOINT SWELLING: 1
HEADACHES: 0
SHORTNESS OF BREATH: 0
HEMATOCHEZIA: 0
PARESTHESIAS: 0
CONSTIPATION: 0
PALPITATIONS: 0
DIZZINESS: 0
WEAKNESS: 0
BREAST MASS: 0
EYE PAIN: 0
ARTHRALGIAS: 1
HEARTBURN: 0
CHILLS: 0
NAUSEA: 0
DIARRHEA: 0
SORE THROAT: 0
DYSURIA: 0
NERVOUS/ANXIOUS: 0
HEMATURIA: 0
MYALGIAS: 1
FEVER: 0
ABDOMINAL PAIN: 0
FREQUENCY: 1

## 2023-10-02 ASSESSMENT — PAIN SCALES - GENERAL: PAINLEVEL: NO PAIN (0)

## 2023-10-02 ASSESSMENT — ACTIVITIES OF DAILY LIVING (ADL): CURRENT_FUNCTION: NO ASSISTANCE NEEDED

## 2023-10-25 LAB — NONINV COLON CA DNA+OCC BLD SCRN STL QL: NEGATIVE

## 2023-11-15 DIAGNOSIS — G45.4 TRANSIENT GLOBAL AMNESIA: ICD-10-CM

## 2023-11-15 RX ORDER — THIAMINE HCL 50 MG
50 TABLET ORAL DAILY
Qty: 100 TABLET | Refills: 1 | Status: SHIPPED | OUTPATIENT
Start: 2023-11-15

## 2023-12-12 ENCOUNTER — TRANSFERRED RECORDS (OUTPATIENT)
Dept: HEALTH INFORMATION MANAGEMENT | Facility: CLINIC | Age: 68
End: 2023-12-12
Payer: COMMERCIAL

## 2023-12-12 LAB
ALT SERPL-CCNC: 22 IU/L (ref 5–35)
AST SERPL-CCNC: 29 U/L (ref 5–34)
CREATININE (EXTERNAL): 0.76 MG/DL (ref 0.5–1.3)

## 2023-12-21 ENCOUNTER — MYC REFILL (OUTPATIENT)
Dept: FAMILY MEDICINE | Facility: CLINIC | Age: 68
End: 2023-12-21
Payer: COMMERCIAL

## 2023-12-21 DIAGNOSIS — Z78.0 MENOPAUSE: Primary | ICD-10-CM

## 2023-12-21 RX ORDER — ESTRADIOL 0.5 MG/1
0.5 TABLET ORAL DAILY
Qty: 90 TABLET | Refills: 3 | Status: SHIPPED | OUTPATIENT
Start: 2023-12-21

## 2024-04-03 DIAGNOSIS — E78.5 HYPERLIPIDEMIA LDL GOAL <100: ICD-10-CM

## 2024-04-03 NOTE — TELEPHONE ENCOUNTER
Triage,    Please contact the patient and clarify what dose she is taking if this medication. Patient reports taking just a half tablet for a total of 5 mg daily.     Thank you,  Bonny Spivey RN

## 2024-04-04 ENCOUNTER — MYC MEDICAL ADVICE (OUTPATIENT)
Dept: FAMILY MEDICINE | Facility: CLINIC | Age: 69
End: 2024-04-04
Payer: COMMERCIAL

## 2024-04-04 DIAGNOSIS — E78.5 HYPERLIPIDEMIA LDL GOAL <100: Primary | ICD-10-CM

## 2024-04-05 NOTE — TELEPHONE ENCOUNTER
Per chart review, pt has not read [employer] message sent.     Patient Contact    Attempt # 2    Was call answered?  No.  Left message on voicemail with information to call me back.    Upon call back, please clarify dosing of crestor.     Thank you,  Vannessa Momin RN

## 2024-04-11 RX ORDER — ROSUVASTATIN CALCIUM 10 MG/1
10 TABLET, COATED ORAL DAILY
Qty: 90 TABLET | Refills: 1 | OUTPATIENT
Start: 2024-04-11

## 2024-04-11 RX ORDER — ROSUVASTATIN CALCIUM 5 MG/1
5 TABLET, COATED ORAL DAILY
Qty: 90 TABLET | Refills: 3 | Status: SHIPPED | OUTPATIENT
Start: 2024-04-11

## 2024-04-11 NOTE — TELEPHONE ENCOUNTER
Upon chart review, patient responded via [employer].         Script sent to the pharmacy yesterday (4/10/24) for Rosuvastatin 5 mg tablets. Will refuse refill request to the pharmacy due to change in dosing.     Bonny Spivey RN

## 2024-04-16 ENCOUNTER — TRANSFERRED RECORDS (OUTPATIENT)
Dept: HEALTH INFORMATION MANAGEMENT | Facility: CLINIC | Age: 69
End: 2024-04-16
Payer: COMMERCIAL

## 2024-04-16 LAB
ALT SERPL-CCNC: 15 IU/L (ref 5–35)
AST SERPL-CCNC: 24 U/L (ref 5–34)
CREATININE (EXTERNAL): 0.75 MG/DL (ref 0.5–1.3)

## 2024-06-28 ENCOUNTER — NURSE TRIAGE (OUTPATIENT)
Dept: FAMILY MEDICINE | Facility: CLINIC | Age: 69
End: 2024-06-28
Payer: COMMERCIAL

## 2024-06-28 DIAGNOSIS — U07.1 INFECTION DUE TO 2019 NOVEL CORONAVIRUS: Primary | ICD-10-CM

## 2024-06-28 NOTE — TELEPHONE ENCOUNTER
RN COVID TREATMENT VISIT  06/28/24      The patient has been triaged and does not require a higher level of care.    Dione Wilson  68 year old  Current weight? 117 lbs    Has the patient been seen by a primary care provider at an [facility] or [facility] within the past two years? Yes.   Have you been in close proximity to/do you have a known exposure to a person with a confirmed case of influenza? No.     General treatment eligibility:  Date of positive COVID test (PCR or at home)? 06/27/2024    Are you or have you been hospitalized for this COVID-19 infection? No.   Have you received monoclonal antibodies or antiviral treatment for COVID-19 since this positive test? No.   Do you have any of the following conditions that place you at risk of being very sick from COVID-19?   - Age 50 years or older  - Heart conditions such as cardiomyopathies, congenital heart defects, coronary artery disease, heart arrhythmias, heart failure, hypertension, valve disorders   Yes, patient has at least one high risk condition as noted above.     Current COVID symptoms:   - fever or chills  - cough  - fatigue  - muscle or body aches  - headache  - sore throat  - congestion or runny nose  - nausea or vomiting  Yes. Patient has at least one symptom as selected.     How many days since symptoms started? 5 days or less. Established patient, 12 years or older weighing at least 88.2 lbs, who has symptoms that started in the past 5 days, has not been hospitalized nor received treatment already, and is at risk for being very sick from COVID-19.     Treatment eligibility by RN:  Are you currently pregnant or nursing? No  Do you have a clinically significant hypersensitivity to nirmatrelvir or ritonavir, or toxic epidermal necrolysis (TEN) or Sanchez-Dom Syndrome? No  Do you have a history of hepatitis, any hepatic impairment on the Problem List (such as Child-Esquivel Class C, cirrhosis, fatty liver disease,  alcoholic liver disease), or was the last liver lab (hepatic panel, ALT, AST, ALK Phos, bilirubin) elevated in the past 6 months? No  Do you have any history of severe renal impairment (eGFR < 30mL/min)? No    Is patient eligible to continue? Yes, patient meets all eligibility requirements for the RN COVID treatment (as denoted by all no responses above).     Current Outpatient Medications   Medication Sig Dispense Refill    Acetaminophen (TYLENOL 8 HOUR PO)       adalimumab (HUMIRA *CF*) 40 MG/0.4ML pen kit Inject 0.4 mLs (40 mg) Subcutaneous every 14 days      aspirin 81 MG EC tablet Take 81 mg by mouth daily      Calcium 250 MG CAPS Take 2,000 mg by mouth daily      estradiol (ESTRACE) 0.5 MG tablet Take 1 tablet (0.5 mg) by mouth daily 90 tablet 3    folic acid (FOLVITE) 1 MG tablet Take 2 mg by mouth daily 4 tabs per day - 2 in am and 2 in evening      hydroxychloroquine (PLAQUENIL) 200 MG tablet Take 1 tablet (200 mg) by mouth daily Takes 1 tab in am, 1/2 tab in pm      Ibuprofen (ADVIL PO)       methotrexate 2.5 MG tablet Take 6 tablets (15 mg) by mouth once a week      rosuvastatin (CRESTOR) 10 MG tablet Take 1 tablet (10 mg) by mouth daily (Patient taking differently: Take 5 mg by mouth daily Taking half tab) 90 tablet 1    rosuvastatin (CRESTOR) 5 MG tablet Take 1 tablet (5 mg) by mouth daily 90 tablet 3    vitamin B1 (THIAMINE) 50 MG tablet TAKE 1 TABLET BY MOUTH EVERY  tablet 1    vitamin D3 (CHOLECALCIFEROL) 50 mcg (2000 units) tablet Take 1 tablet by mouth three times a week         Medications from List 1 of the standing order (on medications that exclude the use of Paxlovid) that patient is taking: NONE. Is patient taking Kelayres's Wort? No  Is patient taking Kelayres's Wort or any meds from List 1? No.   Medications from List 2 of the standing order (on meds that provider needs to adjust) that patient is taking: NONE. Is patient on any of the meds from List 2? No.   Medications from List 3  of standing order (on meds that a RN needs to adjust) that patient is taking: rosuvastatin (Crestor): Instructed patient to stop rosuvastatin while taking Paxlovid and restart rosuvastatin 1 day after the completion of Paxlovid.  Is patient on any meds from List 3? No.     Paxlovid has an approximate 90% reduction in hospitalization. Paxlovid can possibly cause altered sense of taste, diarrhea (loose, watery stools), high blood pressure, muscle aches.     Would patient like a Paxlovid prescription?   Yes.   Lab Results   Component Value Date    GFRESTIMATED >90 06/17/2023       Was last eGFR reduced? No, eGFR 60 or greater/ No Result on record. Patient can receive the normal renal function dose. Paxlovid Rx sent to [facility]   [facility]   [phone]  [address] [place] [address]    Hours:  Mon-Fri: 8:30a - 6:00p  Sat-Sun: 8:30a - 12:30p    Curbside Delivery: Patient to call [phone] to set up and  at door 2 of [facility]    Temporary change to home medications: None    All medication adjustments (holds, etc) were discussed with the patient and patient was asked to repeat back (teachback) their med adjustment.  Did patient understand med adjustment? Yes, patient repeated back and understood correctly.    {    Reviewed the following instructions with the patient:    Paxlovid (nimatrelvir and ritonavir)    How it works  Two medicines (nirmatrelvir and ritonavir) are taken together. They stop the virus from growing. Less amount of virus is easier for your body to fight.    How to take  Medicine comes in a daily container with both medicine tablets. Take by mouth twice daily (once in the morning, once at night) for 5 days.  The number of tablets to take varies by patient.  Don't chew or break capsules. Swallow whole.    When to take  Take as soon as possible after positive COVID-19 test result, and within 5 days of your first symptoms.    Possible side effects  Can cause altered  sense of taste, diarrhea (loose, watery stools), high blood pressure, muscle aches.    Herberth Lee RN         Reason for Disposition   HIGH RISK patient (e.g., weak immune system, age > 64 years, obesity with BMI of 30 or higher, pregnant, chronic lung disease or other chronic medical condition) and COVID symptoms (e.g., cough, fever)  (Exceptions: Already seen by doctor or NP/PA and no new or worsening symptoms.)    Additional Information   Negative: SEVERE difficulty breathing (e.g., struggling for each breath, speaks in single words)   Negative: Difficult to awaken or acting confused (e.g., disoriented, slurred speech)   Negative: Bluish (or gray) lips or face now   Negative: Shock suspected (e.g., cold/pale/clammy skin, too weak to stand, low BP, rapid pulse)   Negative: Sounds like a life-threatening emergency to the triager   Negative: Diagnosed or suspected COVID-19 and symptoms lasting 3 or more weeks   Negative: COVID-19 exposure and no symptoms   Negative: COVID-19 vaccine reaction suspected (e.g., fever, headache, muscle aches) occurring 1 to 3 days after getting vaccine   Negative: COVID-19 vaccine, questions about   Negative: Lives with someone known to have influenza (flu test positive) and flu-like symptoms (e.g., cough, runny nose, sore throat, SOB; with or without fever)   Negative: Possible COVID-19 symptoms and triager concerned about severity of symptoms or other causes   Negative: COVID-19 and breastfeeding, questions about   Negative: SEVERE or constant chest pain or pressure  (Exception: Mild central chest pain, present only when coughing.)   Negative: MODERATE difficulty breathing (e.g., speaks in phrases, SOB even at rest, pulse 100-120)   Negative: Headache and stiff neck (can't touch chin to chest)   Negative: Oxygen level (e.g., pulse oximetry) 90% or lower   Negative: Chest pain or pressure  (Exception: MILD central chest pain, present only when coughing.)   Negative: Drinking very  little and dehydration suspected (e.g., no urine > 12 hours, very dry mouth, very lightheaded)   Negative: Patient sounds very sick or weak to the triager   Negative: MILD difficulty breathing (e.g., minimal/no SOB at rest, SOB with walking, pulse <100)   Negative: Fever > 103 F (39.4 C)   Negative: Fever > 101 F (38.3 C) and over 60 years of age   Negative: Fever > 100.0 F (37.8 C) and bedridden (e.g., CVA, chronic illness, recovering from surgery)    Protocols used: Coronavirus (COVID-19) Diagnosed or [id number]

## 2024-06-28 NOTE — TELEPHONE ENCOUNTER
Dione ONEAL [facility] (supporting Rani Callahan MD)15 hours ago (5:45 PM)     VS  Hello Dr. Callahan and team:  I have been traveling all over the country for a graduation at [employer] and then my son's wedding in [place].  On Sunday/Monday I noticed some cold-like symptoms and just tested positive for COVID.  I am wondering if Paxlovid would help me?  If you think so, could you please provide a prescription to my [facility] in [place] on [address].       Recently, I have also had a steriod injection on 6/14 for major sciatica for recently discovered Degenerative Disc disease and am not taking any meds for this other than some extra doses of tylenol and advil. I don't feel well with numbness still in my legs, leg weakness and numbness in my toes, so this COVID has added to my unhappy current health woes.  [facility] has assisted me with the DDD, MRI, Xrays and the steroid injection.     Thank you,           Patient Contact    Attempt # 1    Was call answered? No.    Left message for patient to call triage back.    Suni Acosta RN

## 2024-07-25 ENCOUNTER — MEDICAL CORRESPONDENCE (OUTPATIENT)
Dept: HEALTH INFORMATION MANAGEMENT | Facility: CLINIC | Age: 69
End: 2024-07-25
Payer: COMMERCIAL

## 2024-07-31 ENCOUNTER — OFFICE VISIT (OUTPATIENT)
Dept: FAMILY MEDICINE | Facility: CLINIC | Age: 69
End: 2024-07-31
Payer: COMMERCIAL

## 2024-07-31 VITALS
WEIGHT: 119 LBS | TEMPERATURE: 96.9 F | DIASTOLIC BLOOD PRESSURE: 69 MMHG | HEIGHT: 60 IN | RESPIRATION RATE: 16 BRPM | OXYGEN SATURATION: 97 % | HEART RATE: 72 BPM | BODY MASS INDEX: 23.36 KG/M2 | SYSTOLIC BLOOD PRESSURE: 105 MMHG

## 2024-07-31 DIAGNOSIS — M54.17 LUMBOSACRAL RADICULOPATHY AT L5: Primary | ICD-10-CM

## 2024-07-31 DIAGNOSIS — M06.9 RHEUMATOID ARTHRITIS, RHEUMATOID FACTOR STATUS UNKNOWN (H): ICD-10-CM

## 2024-07-31 DIAGNOSIS — Z01.818 PREOP GENERAL PHYSICAL EXAM: ICD-10-CM

## 2024-07-31 DIAGNOSIS — I25.10 CORONARY ARTERY DISEASE INVOLVING NATIVE CORONARY ARTERY OF NATIVE HEART WITHOUT ANGINA PECTORIS: ICD-10-CM

## 2024-07-31 DIAGNOSIS — M48.062 SPINAL STENOSIS OF LUMBAR REGION WITH NEUROGENIC CLAUDICATION: ICD-10-CM

## 2024-07-31 PROBLEM — I20.89 OTHER FORMS OF ANGINA PECTORIS (H): Status: RESOLVED | Noted: 2020-12-08 | Resolved: 2024-07-31

## 2024-07-31 LAB
ERYTHROCYTE [DISTWIDTH] IN BLOOD BY AUTOMATED COUNT: 14.6 % (ref 10–15)
HCT VFR BLD AUTO: 39.9 % (ref 35–47)
HGB BLD-MCNC: 13.1 G/DL (ref 11.7–15.7)
MCH RBC QN AUTO: 31.2 PG (ref 26.5–33)
MCHC RBC AUTO-ENTMCNC: 32.8 G/DL (ref 31.5–36.5)
MCV RBC AUTO: 95 FL (ref 78–100)
PLATELET # BLD AUTO: 279 10E3/UL (ref 150–450)
RBC # BLD AUTO: 4.2 10E6/UL (ref 3.8–5.2)
WBC # BLD AUTO: 5.9 10E3/UL (ref 4–11)

## 2024-07-31 PROCEDURE — 85027 COMPLETE CBC AUTOMATED: CPT | Performed by: PHYSICIAN ASSISTANT

## 2024-07-31 PROCEDURE — 99214 OFFICE O/P EST MOD 30 MIN: CPT | Performed by: PHYSICIAN ASSISTANT

## 2024-07-31 PROCEDURE — 36415 COLL VENOUS BLD VENIPUNCTURE: CPT | Performed by: PHYSICIAN ASSISTANT

## 2024-07-31 PROCEDURE — 80061 LIPID PANEL: CPT | Performed by: PHYSICIAN ASSISTANT

## 2024-07-31 PROCEDURE — 80053 COMPREHEN METABOLIC PANEL: CPT | Performed by: PHYSICIAN ASSISTANT

## 2024-07-31 PROCEDURE — 93000 ELECTROCARDIOGRAM COMPLETE: CPT | Performed by: PHYSICIAN ASSISTANT

## 2024-07-31 RX ORDER — ADALIMUMAB-ADAZ 40 MG/.4ML
INJECTION, SOLUTION SUBCUTANEOUS
COMMUNITY
Start: 2024-07-31

## 2024-07-31 RX ORDER — GABAPENTIN 100 MG/1
100 CAPSULE ORAL 3 TIMES DAILY
COMMUNITY
Start: 2024-07-31 | End: 2024-10-07

## 2024-07-31 ASSESSMENT — PATIENT HEALTH QUESTIONNAIRE - PHQ9
SUM OF ALL RESPONSES TO PHQ QUESTIONS 1-9: 7
10. IF YOU CHECKED OFF ANY PROBLEMS, HOW DIFFICULT HAVE THESE PROBLEMS MADE IT FOR YOU TO DO YOUR WORK, TAKE CARE OF THINGS AT HOME, OR GET ALONG WITH OTHER PEOPLE: VERY DIFFICULT
SUM OF ALL RESPONSES TO PHQ QUESTIONS 1-9: 7

## 2024-07-31 ASSESSMENT — PAIN SCALES - GENERAL: PAINLEVEL: MODERATE PAIN (5)

## 2024-07-31 NOTE — PROGRESS NOTES
Preoperative Evaluation  [address], [address]  [facility] [address]  Phone: [phone]  Primary Provider: Rani Callahan MD  Pre-op Performing Provider: Coni Jensen PA-C  Jul 31, 2024 7/29/2024   Surgical Information   What procedure is being done? Bilateral lumbar 5 to sacral 1 foraminotomy        Facility or Hospital where procedure/surgery will be performed:  OR   Who is doing the procedure / surgery? Marc Rao   Date of surgery / procedure: August 7, 2024   Time of surgery / procedure: 12:00 PM   Where do you plan to recover after surgery? at home with family        Fax number for surgical facility: Fax to Care Coordinator as well at [phone]    Assessment & Plan     The proposed surgical procedure is considered INTERMEDIATE risk.    Lumbosacral radiculopathy at L5      Spinal stenosis of lumbar region with neurogenic claudication  -pt may continue her gabapentin 100mg TID. She is not sure if that is helping.    Preop general physical exam    - COMPREHENSIVE METABOLIC PANEL  - CBC with Platelets  - EKG 12-lead complete w/read - Clinics    Rheumatoid arthritis, rheumatoid factor status unknown (H)  - pt has already stopped Hyrimoz and asa for upcoming surgery  - pt continue methotrexate and plaquenil as prescribed    Coronary artery disease involving native coronary artery of native heart without angina pectoris    - Lipid panel reflex to direct LDL Non-fasting           Risks and Recommendations  The patient has the following additional risks and recommendations for perioperative complications:   - Consult Hospitalist / IM to assist with post-op medical management         Recommendation  Pt has already put a hold on asa, ibuprofen and hyrimoz  Cont methotrexate and plaquenil as prescribed    Corrina Parham is a 68 year old, presenting for the following:  Pre-Op Exam        HPI related to upcoming procedure: pt presented to [facility] with florecita SIMPSON   pain over the past few months. MRI revealed L5-S1 foraminal stenosis. Tried NATALIO without relief. Needing to use a cane now for ambulation.        7/29/2024   Pre-Op Questionnaire   Have you ever had a heart attack or stroke? No   Have you ever had surgery on your heart or blood vessels, such as a stent placement, a coronary artery bypass, or surgery on an artery in your head, neck, heart, or legs? No   Do you have chest pain with activity? No   Do you have a history of heart failure? No   Do you currently have a cold, bronchitis or symptoms of other infection? No   Do you have a cough, shortness of breath, or wheezing? No   Do you or anyone in your family have previous history of blood clots? No   Do you or does anyone in your family have a serious bleeding problem such as prolonged bleeding following surgeries or cuts? No   Have you ever had problems with anemia or been told to take iron pills? No   Have you had any abnormal blood loss such as black, tarry or bloody stools, or abnormal vaginal bleeding? No   Have you ever had a blood transfusion? No   Are you willing to have a blood transfusion if it is medically needed before, during, or after your surgery? Yes   Have you or any of your relatives ever had problems with anesthesia? No   Do you have sleep apnea, excessive snoring or daytime drowsiness? No   Do you have any artifical heart valves or other implanted medical devices like a pacemaker, defibrillator, or continuous glucose monitor? No   Do you have artificial joints? No   Are you allergic to latex? No        Health Care Directive  Patient does not have a Health Care Directive or Living Will: Discussed advance care planning with patient; however, patient declined at this time.    Preoperative Review of    reviewed - controlled substances reflected in medication list.          Patient Active Problem List    Diagnosis Date Noted    Family hx colonic polyps 01/10/2022     Priority: Medium    Recurrent  cold sores      Priority: Medium    Daily headache      Priority: Medium    Coronary artery disease involving native coronary artery of native heart without angina pectoris      Priority: Medium     cardiac cath 12/8/2020: mild non-obstructive disease      Rheumatoid arthritis, rheumatoid factor status unknown (H)      Priority: Medium    Status post coronary angiogram 12/08/2020     Priority: Medium    CARDIOVASCULAR SCREENING; LDL GOAL LESS THAN 160 03/11/2014     Priority: Medium      Past Medical History:   Diagnosis Date    Coronary artery disease involving native coronary artery of native heart without angina pectoris     cardiac cath 12/8/2020: mild non-obstructive disease    Daily headache     Recurrent cold sores     Rheumatoid arthritis, rheumatoid factor status unknown (H)      Past Surgical History:   Procedure Laterality Date    COLONOSCOPY      COLONOSCOPY N/A 12/6/2016    Procedure: COLONOSCOPY;  Surgeon: Philip Santiago MD;  Location:  GI    COLPORRHAPHY N/A 4/7/2015    Procedure: UTEROSACRAL LIGAMENT SUSPENSION PLICATION,  ANTERIOR POSTERIOR POSSIBLE REPAIR;  Surgeon: Diana Casper MD;  Location: [facility];  Service:     CV LEFT HEART CATH N/A 12/8/2020    Procedure: Left Heart Cath;  Surgeon: Aleksander López MD;  Location:  HEART CARDIAC CATH LAB    CV LEFT VENTRICULOGRAM N/A 12/8/2020    Procedure: Left Ventriculogram;  Surgeon: Aleksander López MD;  Location:  HEART CARDIAC CATH LAB    DILATION AND CURETTAGE      ENT SURGERY      T&A    GYN SURGERY      vag hyst    PELVIC LAPAROSCOPY      MS VAGINAL HYSTERECTOMY,UTERUS 250 GMS/< N/A 4/7/2015    Procedure: VAGINAL HYSTERECTOMY RIGHT SALPINGO O-OPHERECTOMY POSTERIOR REPAIR UTEROSACRAL LIGAMENT SUSPENSION CYSTOSCOPY TRANSVAGINAL TAPE OBTURATOR;  Surgeon: Diana Casper MD;  Location: [facility];  Service: Gynecology    TONSILLECTOMY       Current Outpatient Medications   Medication Sig  Dispense Refill    Acetaminophen (TYLENOL 8 HOUR PO)       Adalimumab-adaz (HYRIMOZ) 40 MG/0.4ML SOAJ       aspirin 81 MG EC tablet Take 81 mg by mouth daily      Calcium 250 MG CAPS Take 2,000 mg by mouth daily      estradiol (ESTRACE) 0.5 MG tablet Take 1 tablet (0.5 mg) by mouth daily 90 tablet 3    folic acid (FOLVITE) 1 MG tablet Take 2 mg by mouth daily 4 tabs per day - 2 in am and 2 in evening      gabapentin (NEURONTIN) 100 MG capsule Take 1 capsule (100 mg) by mouth 3 times daily      hydroxychloroquine (PLAQUENIL) 200 MG tablet Take 1 tablet (200 mg) by mouth daily Takes 1 tab in am, 1/2 tab in pm      Ibuprofen (ADVIL PO)       methotrexate 2.5 MG tablet Take 6 tablets (15 mg) by mouth once a week      rosuvastatin (CRESTOR) 5 MG tablet Take 1 tablet (5 mg) by mouth daily 90 tablet 3    vitamin B1 (THIAMINE) 50 MG tablet TAKE 1 TABLET BY MOUTH EVERY  tablet 1    vitamin D3 (CHOLECALCIFEROL) 50 mcg (2000 units) tablet Take 1 tablet by mouth three times a week         Allergies   Allergen Reactions    Minocycline      Daughter and son reaction    Tetracycline      Daughter and son allergy        Social History     Tobacco Use    Smoking status: Never    Smokeless tobacco: Never   Substance Use Topics    Alcohol use: Yes     Comment: 2-4 per week     Family History   Problem Relation Age of Onset    Peptic Ulcer Disease Mother     Hypertension Mother     Osteoporosis Mother     Diabetes Father     Cancer Father         biliary    Other Cancer Father     Crohn's Disease Brother     Diabetes Brother     Coronary Artery Disease Brother     Hypertension Brother     Coronary Artery Disease Brother 60    Breast Cancer Maternal Aunt     Breast Cancer Other         Aunt     History   Drug Use No             Review of Systems  Constitutional, neuro, ENT, endocrine, pulmonary, cardiac, gastrointestinal, genitourinary, musculoskeletal, integument and psychiatric systems are negative, except as otherwise  "noted.    Objective    /69 (BP Location: Left arm, Patient Position: Sitting, Cuff Size: Adult Regular)   Pulse 72   Temp 96.9  F (36.1  C) (Temporal)   Resp 16   Ht 1.531 m (5' 0.28\")   Wt 54 kg (119 lb)   SpO2 97%   BMI 23.03 kg/m     Estimated body mass index is 23.03 kg/m  as calculated from the following:    Height as of this encounter: 1.531 m (5' 0.28\").    Weight as of this encounter: 54 kg (119 lb).  Physical Exam  GENERAL: alert and no distress  EYES: Eyes grossly normal to inspection, conjunctivae and sclerae normal  HENT: ear canals and TM's normal, nose and mouth without ulcers or lesions  NECK: no adenopathy, no asymmetry, masses, or scars  RESP: lungs clear to auscultation - no rales, rhonchi or wheezes  CV: regular rate and rhythm, normal S1 S2, no S3 or S4, no murmur, click or rub, no peripheral edema  ABDOMEN: soft, nontender, no hepatosplenomegaly, no masses and bowel sounds normal  MS: no gross musculoskeletal defects noted, no edema  SKIN: no suspicious lesions or rashes  NEURO: mentation intact and speech normal  PSYCH: mentation appears normal, affect normal/bright    Diagnostics     Results for orders placed or performed in visit on 07/31/24   CBC with Platelets     Status: Normal   Result Value Ref Range    WBC Count 5.9 4.0 - 11.0 10e3/uL    RBC Count 4.20 3.80 - 5.20 10e6/uL    Hemoglobin 13.1 11.7 - 15.7 g/dL    Hematocrit 39.9 35.0 - 47.0 %    MCV 95 78 - 100 fL    MCH 31.2 26.5 - 33.0 pg    MCHC 32.8 31.5 - 36.5 g/dL    RDW 14.6 10.0 - 15.0 %    Platelet Count 279 150 - 450 10e3/uL     CMP pending: see results in Epic  EKG:  Sinus Rhythm   -Incomplete right bundle branch block and left axis -anterior fascicular block.  -Left atrial enlargement.  -Diffuse nonspecific T-abnormality.  -Unchanged compared to previous    Revised Cardiac Risk Index (RCRI)  The patient has the following serious cardiovascular risks for perioperative complications:   - No serious cardiac risks = 0 " points     RCRI Interpretation: 0 points: Class I (very low risk - 0.4% complication rate)         Signed Electronically by: Coni Jensen PA-C  A copy of this evaluation report is provided to the requesting physician.

## 2024-08-01 LAB
ALBUMIN SERPL BCG-MCNC: 4.8 G/DL (ref 3.5–5.2)
ALP SERPL-CCNC: 65 U/L (ref 40–150)
ALT SERPL W P-5'-P-CCNC: 38 U/L (ref 0–50)
ANION GAP SERPL CALCULATED.3IONS-SCNC: 10 MMOL/L (ref 7–15)
AST SERPL W P-5'-P-CCNC: 25 U/L (ref 0–45)
BILIRUB SERPL-MCNC: 0.3 MG/DL
BUN SERPL-MCNC: 13.7 MG/DL (ref 8–23)
CALCIUM SERPL-MCNC: 9.7 MG/DL (ref 8.8–10.4)
CHLORIDE SERPL-SCNC: 104 MMOL/L (ref 98–107)
CHOLEST SERPL-MCNC: 204 MG/DL
CREAT SERPL-MCNC: 0.61 MG/DL (ref 0.51–0.95)
EGFRCR SERPLBLD CKD-EPI 2021: >90 ML/MIN/1.73M2
FASTING STATUS PATIENT QL REPORTED: NO
FASTING STATUS PATIENT QL REPORTED: NO
GLUCOSE SERPL-MCNC: 104 MG/DL (ref 70–99)
HCO3 SERPL-SCNC: 27 MMOL/L (ref 22–29)
HDLC SERPL-MCNC: 107 MG/DL
LDLC SERPL CALC-MCNC: 81 MG/DL
NONHDLC SERPL-MCNC: 97 MG/DL
POTASSIUM SERPL-SCNC: 5.2 MMOL/L (ref 3.4–5.3)
PROT SERPL-MCNC: 7.2 G/DL (ref 6.4–8.3)
SODIUM SERPL-SCNC: 141 MMOL/L (ref 135–145)
TRIGL SERPL-MCNC: 78 MG/DL

## 2024-08-02 RX ORDER — HYDROXYCHLOROQUINE SULFATE 200 MG/1
200 TABLET, FILM COATED ORAL DAILY
COMMUNITY

## 2024-08-02 RX ORDER — PSEUDOEPHEDRINE HCL 60 MG
60 TABLET ORAL EVERY MORNING
COMMUNITY

## 2024-08-02 NOTE — PROGRESS NOTES
PTA medications updated by Medication Scribe prior to surgery via phone call with patient (last doses completed by Nurse)     Medication history sources: Patient, Surescripts, and H&P  In the past week, patient estimated taking medication this percent of the time: Greater than 90%      Significant changes made to the medication list:  None      Additional medication history information:   None    Medication reconciliation completed by provider prior to medication history? No    Time spent in this activity: 40 minutes    The information provided in this note is only as accurate as the sources available at the time of update(s)      Prior to Admission medications    Medication Sig Last Dose Taking? Auth Provider Long Term End Date   Acetaminophen (TYLENOL 8 HOUR PO) Take 1-2 tablets by mouth 3 times daily as needed Unknown at prn Yes Reported, Patient     Adalimumab-adaz (HYRIMOZ) 40 MG/0.4ML SOAJ Inject subcutaneously every 14 days 7/16/2024 at unknown Yes Coni Jensen PA-C     aspirin 81 MG EC tablet Take 81 mg by mouth daily 7/30/2024 at am Yes Reported, Patient     Calcium 250 MG CAPS Take 4 capsules by mouth 2 times daily  at pm Yes Reported, Patient     estradiol (ESTRACE) 0.5 MG tablet Take 1 tablet (0.5 mg) by mouth daily  at am Yes Rani Callahan MD Yes    folic acid (FOLVITE) 1 MG tablet Take 2 mg by mouth 2 times daily (2 x 1mg = 2mg)  at pm Yes Reported, Patient     gabapentin (NEURONTIN) 100 MG capsule Take 1 capsule (100 mg) by mouth 3 times daily  at pm Yes Coni Jensen PA-C Yes    hydroxychloroquine (PLAQUENIL) 200 MG tablet Take 100 mg by mouth every evening (0.5 x 200 mg = 100 mg)  at pm Yes Reported, Patient     hydroxychloroquine (PLAQUENIL) 200 MG tablet Take 200 mg by mouth every morning  at am Yes Marilyn Espinosa PA-C     ibuprofen (ADVIL/MOTRIN) 200 MG tablet Take 200-400 mg by mouth every 4 hours as needed 7/30/2024 at prn Yes Reported, Patient     methotrexate 2.5 MG tablet  Take 15 mg by mouth once a week (6 x 2.5mg = 15mg:Wednesdays) 7/31/2024 at pm Yes Rani Callahan MD Yes    pseudoePHEDrine (SUDAFED) 60 MG tablet Take 60 mg by mouth every morning  at am Yes Reported, Patient     rosuvastatin (CRESTOR) 5 MG tablet Take 1 tablet (5 mg) by mouth daily 7/30/2024 at pm Yes Rani Callahan MD Yes    vitamin B1 (THIAMINE) 50 MG tablet TAKE 1 TABLET BY MOUTH EVERY DAY 7/29/2024 at am Yes Sharee Ha PA-C     vitamin D3 (CHOLECALCIFEROL) 50 mcg (2000 units) tablet Take 1 tablet by mouth three times a week (Monday, Wednesday & Fridays) 7/29/2024 at am Yes Rani Callahan MD         Medication history completed by: Jaclyn Jameson LPN

## 2024-08-02 NOTE — RESULT ENCOUNTER NOTE
Dione,    Your lab work shows normal electrolytes, kidney and liver functions  Your cholesterol profile looks great on your current dose of Crestor.  Your white and red blood cell counts are normal.    Coni Jensen PA-C   Final Anesthesia Post-op Assessment    Patient: Sun Chaney  Procedure(s) Performed: LEFT JJ STENT PLACEMENT   - LEFTEXTRA CORPORAL SHOCK WAVE LITHOTRIPSY - LEFT  Anesthesia type: General    Vital Last Value   Temperature 36.4 °C (97.6 °F) (03/28/19 1208)   Pulse 79 (03/28/19 1308)   Respiratory Rate 15 (03/28/19 1248)   Non-Invasive   Blood Pressure 147/71 (03/28/19 1308)   Arterial  Blood Pressure     Pulse Oximetry 94 % (03/28/19 1308)     Last 24 I/O:     Intake/Output Summary (Last 24 hours) at 3/28/2019 1414  Last data filed at 3/28/2019 1206  Gross per 24 hour   Intake 800 ml   Output 0 ml   Net 800 ml       PATIENT LOCATION: Phase II  POST-OP VITAL SIGNS: stable  LEVEL OF CONSCIOUSNESS: participates in exam, answers questions appropriately, alert and awake  RESPIRATORY STATUS: spontaneous ventilation  CARDIOVASCULAR: blood pressure returned to baseline  HYDRATION: euvolemic    PAIN MANAGEMENT: well controlled  NAUSEA: None  AIRWAY PATENCY: patent  POST-OP ASSESSMENT: no complications, patient tolerated procedure well with no complications, no evidence of recall and sufficiently recovered from acute administration of anesthesia effects and able to participate in evaluation  COMPLICATIONS: none  Comments: Woke up very well.  Ready and appropriate for discharge.

## 2024-08-07 ENCOUNTER — HOSPITAL ENCOUNTER (OUTPATIENT)
Facility: CLINIC | Age: 69
Discharge: HOME | End: 2024-08-07
Attending: ORTHOPAEDIC SURGERY | Admitting: ORTHOPAEDIC SURGERY
Payer: COMMERCIAL

## 2024-08-07 ENCOUNTER — ANESTHESIA EVENT (OUTPATIENT)
Dept: SURGERY | Facility: CLINIC | Age: 69
End: 2024-08-07
Payer: COMMERCIAL

## 2024-08-07 ENCOUNTER — ANESTHESIA (OUTPATIENT)
Dept: SURGERY | Facility: CLINIC | Age: 69
End: 2024-08-07
Payer: COMMERCIAL

## 2024-08-07 ENCOUNTER — APPOINTMENT (OUTPATIENT)
Dept: GENERAL RADIOLOGY | Facility: CLINIC | Age: 69
End: 2024-08-07
Attending: ORTHOPAEDIC SURGERY
Payer: COMMERCIAL

## 2024-08-07 VITALS
SYSTOLIC BLOOD PRESSURE: 121 MMHG | HEART RATE: 78 BPM | DIASTOLIC BLOOD PRESSURE: 83 MMHG | OXYGEN SATURATION: 98 % | TEMPERATURE: 97 F | BODY MASS INDEX: 22.47 KG/M2 | HEIGHT: 61 IN | RESPIRATION RATE: 16 BRPM | WEIGHT: 119 LBS

## 2024-08-07 DIAGNOSIS — Z98.890 STATUS POST LUMBAR SPINE SURGERY FOR DECOMPRESSION OF SPINAL CORD: Primary | ICD-10-CM

## 2024-08-07 PROCEDURE — 250N000011 HC RX IP 250 OP 636: Performed by: ORTHOPAEDIC SURGERY

## 2024-08-07 PROCEDURE — 250N000011 HC RX IP 250 OP 636: Performed by: NURSE ANESTHETIST, CERTIFIED REGISTERED

## 2024-08-07 PROCEDURE — 250N000009 HC RX 250: Performed by: ORTHOPAEDIC SURGERY

## 2024-08-07 PROCEDURE — 999N000179 XR SURGERY CARM FLUORO LESS THAN 5 MIN W STILLS

## 2024-08-07 PROCEDURE — 63005 REMOVE SPINE LAMINA 1/2 LMBR: CPT | Performed by: STUDENT IN AN ORGANIZED HEALTH CARE EDUCATION/TRAINING PROGRAM

## 2024-08-07 PROCEDURE — 250N000009 HC RX 250: Performed by: NURSE ANESTHETIST, CERTIFIED REGISTERED

## 2024-08-07 PROCEDURE — 250N000025 HC SEVOFLURANE, PER MIN: Performed by: ORTHOPAEDIC SURGERY

## 2024-08-07 PROCEDURE — 999N000141 HC STATISTIC PRE-PROCEDURE NURSING ASSESSMENT: Performed by: ORTHOPAEDIC SURGERY

## 2024-08-07 PROCEDURE — 370N000017 HC ANESTHESIA TECHNICAL FEE, PER MIN: Performed by: ORTHOPAEDIC SURGERY

## 2024-08-07 PROCEDURE — 360N000084 HC SURGERY LEVEL 4 W/ FLUORO, PER MIN: Performed by: ORTHOPAEDIC SURGERY

## 2024-08-07 PROCEDURE — 250N000011 HC RX IP 250 OP 636: Performed by: STUDENT IN AN ORGANIZED HEALTH CARE EDUCATION/TRAINING PROGRAM

## 2024-08-07 PROCEDURE — 272N000001 HC OR GENERAL SUPPLY STERILE: Performed by: ORTHOPAEDIC SURGERY

## 2024-08-07 PROCEDURE — 250N000013 HC RX MED GY IP 250 OP 250 PS 637: Performed by: ORTHOPAEDIC SURGERY

## 2024-08-07 PROCEDURE — 63005 REMOVE SPINE LAMINA 1/2 LMBR: CPT | Performed by: NURSE ANESTHETIST, CERTIFIED REGISTERED

## 2024-08-07 PROCEDURE — 258N000003 HC RX IP 258 OP 636: Performed by: NURSE ANESTHETIST, CERTIFIED REGISTERED

## 2024-08-07 PROCEDURE — 710N000009 HC RECOVERY PHASE 1, LEVEL 1, PER MIN: Performed by: ORTHOPAEDIC SURGERY

## 2024-08-07 PROCEDURE — 710N000012 HC RECOVERY PHASE 2, PER MINUTE: Performed by: ORTHOPAEDIC SURGERY

## 2024-08-07 PROCEDURE — 250N000009 HC RX 250: Performed by: STUDENT IN AN ORGANIZED HEALTH CARE EDUCATION/TRAINING PROGRAM

## 2024-08-07 RX ORDER — KETOROLAC TROMETHAMINE 30 MG/ML
INJECTION, SOLUTION INTRAMUSCULAR; INTRAVENOUS PRN
Status: DISCONTINUED | OUTPATIENT
Start: 2024-08-07 | End: 2024-08-07

## 2024-08-07 RX ORDER — OXYCODONE HYDROCHLORIDE 5 MG/1
5-10 TABLET ORAL EVERY 4 HOURS PRN
Qty: 12 TABLET | Refills: 0 | Status: SHIPPED | OUTPATIENT
Start: 2024-08-07 | End: 2024-10-07

## 2024-08-07 RX ORDER — HYDROMORPHONE HCL IN WATER/PF 6 MG/30 ML
0.4 PATIENT CONTROLLED ANALGESIA SYRINGE INTRAVENOUS EVERY 5 MIN PRN
Status: DISCONTINUED | OUTPATIENT
Start: 2024-08-07 | End: 2024-08-07 | Stop reason: HOSPADM

## 2024-08-07 RX ORDER — FENTANYL CITRATE 50 UG/ML
INJECTION, SOLUTION INTRAMUSCULAR; INTRAVENOUS PRN
Status: DISCONTINUED | OUTPATIENT
Start: 2024-08-07 | End: 2024-08-07

## 2024-08-07 RX ORDER — SODIUM CHLORIDE, SODIUM LACTATE, POTASSIUM CHLORIDE, CALCIUM CHLORIDE 600; 310; 30; 20 MG/100ML; MG/100ML; MG/100ML; MG/100ML
INJECTION, SOLUTION INTRAVENOUS CONTINUOUS
Status: DISCONTINUED | OUTPATIENT
Start: 2024-08-07 | End: 2024-08-07 | Stop reason: HOSPADM

## 2024-08-07 RX ORDER — LIDOCAINE HYDROCHLORIDE 20 MG/ML
INJECTION, SOLUTION INFILTRATION; PERINEURAL PRN
Status: DISCONTINUED | OUTPATIENT
Start: 2024-08-07 | End: 2024-08-07

## 2024-08-07 RX ORDER — GLYCOPYRROLATE 0.2 MG/ML
INJECTION, SOLUTION INTRAMUSCULAR; INTRAVENOUS PRN
Status: DISCONTINUED | OUTPATIENT
Start: 2024-08-07 | End: 2024-08-07

## 2024-08-07 RX ORDER — SODIUM CHLORIDE, SODIUM LACTATE, POTASSIUM CHLORIDE, CALCIUM CHLORIDE 600; 310; 30; 20 MG/100ML; MG/100ML; MG/100ML; MG/100ML
INJECTION, SOLUTION INTRAVENOUS CONTINUOUS PRN
Status: DISCONTINUED | OUTPATIENT
Start: 2024-08-07 | End: 2024-08-07

## 2024-08-07 RX ORDER — ONDANSETRON 2 MG/ML
4 INJECTION INTRAMUSCULAR; INTRAVENOUS EVERY 30 MIN PRN
Status: DISCONTINUED | OUTPATIENT
Start: 2024-08-07 | End: 2024-08-07 | Stop reason: HOSPADM

## 2024-08-07 RX ORDER — FENTANYL CITRATE 50 UG/ML
50 INJECTION, SOLUTION INTRAMUSCULAR; INTRAVENOUS EVERY 5 MIN PRN
Status: DISCONTINUED | OUTPATIENT
Start: 2024-08-07 | End: 2024-08-07 | Stop reason: HOSPADM

## 2024-08-07 RX ORDER — CEFAZOLIN SODIUM/WATER 2 G/20 ML
2 SYRINGE (ML) INTRAVENOUS SEE ADMIN INSTRUCTIONS
Status: DISCONTINUED | OUTPATIENT
Start: 2024-08-07 | End: 2024-08-07 | Stop reason: HOSPADM

## 2024-08-07 RX ORDER — DEXAMETHASONE SODIUM PHOSPHATE 4 MG/ML
INJECTION, SOLUTION INTRA-ARTICULAR; INTRALESIONAL; INTRAMUSCULAR; INTRAVENOUS; SOFT TISSUE PRN
Status: DISCONTINUED | OUTPATIENT
Start: 2024-08-07 | End: 2024-08-07

## 2024-08-07 RX ORDER — ONDANSETRON 4 MG/1
4 TABLET, ORALLY DISINTEGRATING ORAL EVERY 30 MIN PRN
Status: DISCONTINUED | OUTPATIENT
Start: 2024-08-07 | End: 2024-08-07 | Stop reason: HOSPADM

## 2024-08-07 RX ORDER — ASPIRIN 81 MG/1
81 TABLET ORAL DAILY
Status: SHIPPED
Start: 2024-08-09

## 2024-08-07 RX ORDER — ACETAMINOPHEN 325 MG/1
975 TABLET ORAL ONCE
Status: COMPLETED | OUTPATIENT
Start: 2024-08-07 | End: 2024-08-07

## 2024-08-07 RX ORDER — BUPIVACAINE HYDROCHLORIDE AND EPINEPHRINE 5; 5 MG/ML; UG/ML
INJECTION, SOLUTION PERINEURAL PRN
Status: DISCONTINUED | OUTPATIENT
Start: 2024-08-07 | End: 2024-08-07 | Stop reason: HOSPADM

## 2024-08-07 RX ORDER — ACETAMINOPHEN 325 MG/1
650 TABLET ORAL
Status: DISCONTINUED | OUTPATIENT
Start: 2024-08-07 | End: 2024-08-07 | Stop reason: HOSPADM

## 2024-08-07 RX ORDER — HYDROMORPHONE HCL IN WATER/PF 6 MG/30 ML
0.2 PATIENT CONTROLLED ANALGESIA SYRINGE INTRAVENOUS EVERY 5 MIN PRN
Status: DISCONTINUED | OUTPATIENT
Start: 2024-08-07 | End: 2024-08-07 | Stop reason: HOSPADM

## 2024-08-07 RX ORDER — FENTANYL CITRATE 50 UG/ML
25 INJECTION, SOLUTION INTRAMUSCULAR; INTRAVENOUS EVERY 5 MIN PRN
Status: DISCONTINUED | OUTPATIENT
Start: 2024-08-07 | End: 2024-08-07 | Stop reason: HOSPADM

## 2024-08-07 RX ORDER — CEFAZOLIN SODIUM/WATER 2 G/20 ML
2 SYRINGE (ML) INTRAVENOUS
Status: DISCONTINUED | OUTPATIENT
Start: 2024-08-07 | End: 2024-08-07 | Stop reason: HOSPADM

## 2024-08-07 RX ORDER — AMOXICILLIN 250 MG
1-2 CAPSULE ORAL 2 TIMES DAILY
Qty: 28 TABLET | Refills: 0 | Status: SHIPPED | OUTPATIENT
Start: 2024-08-07 | End: 2024-08-14

## 2024-08-07 RX ORDER — PROPOFOL 10 MG/ML
INJECTION, EMULSION INTRAVENOUS CONTINUOUS PRN
Status: DISCONTINUED | OUTPATIENT
Start: 2024-08-07 | End: 2024-08-07

## 2024-08-07 RX ORDER — ONDANSETRON 2 MG/ML
INJECTION INTRAMUSCULAR; INTRAVENOUS PRN
Status: DISCONTINUED | OUTPATIENT
Start: 2024-08-07 | End: 2024-08-07

## 2024-08-07 RX ORDER — OXYCODONE HYDROCHLORIDE 5 MG/1
5 TABLET ORAL
Status: DISCONTINUED | OUTPATIENT
Start: 2024-08-07 | End: 2024-08-07 | Stop reason: HOSPADM

## 2024-08-07 RX ORDER — DEXAMETHASONE SODIUM PHOSPHATE 4 MG/ML
4 INJECTION, SOLUTION INTRA-ARTICULAR; INTRALESIONAL; INTRAMUSCULAR; INTRAVENOUS; SOFT TISSUE
Status: DISCONTINUED | OUTPATIENT
Start: 2024-08-07 | End: 2024-08-07 | Stop reason: HOSPADM

## 2024-08-07 RX ORDER — PROPOFOL 10 MG/ML
INJECTION, EMULSION INTRAVENOUS PRN
Status: DISCONTINUED | OUTPATIENT
Start: 2024-08-07 | End: 2024-08-07

## 2024-08-07 RX ORDER — NALOXONE HYDROCHLORIDE 0.4 MG/ML
0.1 INJECTION, SOLUTION INTRAMUSCULAR; INTRAVENOUS; SUBCUTANEOUS
Status: DISCONTINUED | OUTPATIENT
Start: 2024-08-07 | End: 2024-08-07 | Stop reason: HOSPADM

## 2024-08-07 RX ADMIN — Medication 2 G: at 13:30

## 2024-08-07 RX ADMIN — ACETAMINOPHEN 975 MG: 325 TABLET, FILM COATED ORAL at 11:30

## 2024-08-07 RX ADMIN — ONDANSETRON 4 MG: 2 INJECTION INTRAMUSCULAR; INTRAVENOUS at 13:24

## 2024-08-07 RX ADMIN — ROCURONIUM BROMIDE 50 MG: 50 INJECTION, SOLUTION INTRAVENOUS at 13:20

## 2024-08-07 RX ADMIN — DEXMEDETOMIDINE HYDROCHLORIDE 4 MCG: 100 INJECTION, SOLUTION INTRAVENOUS at 13:45

## 2024-08-07 RX ADMIN — PROPOFOL 50 MCG/KG/MIN: 10 INJECTION, EMULSION INTRAVENOUS at 14:00

## 2024-08-07 RX ADMIN — LIDOCAINE HYDROCHLORIDE 40 MG: 20 INJECTION, SOLUTION INFILTRATION; PERINEURAL at 13:19

## 2024-08-07 RX ADMIN — DEXAMETHASONE SODIUM PHOSPHATE 4 MG: 4 INJECTION, SOLUTION INTRA-ARTICULAR; INTRALESIONAL; INTRAMUSCULAR; INTRAVENOUS; SOFT TISSUE at 13:24

## 2024-08-07 RX ADMIN — SODIUM CHLORIDE, POTASSIUM CHLORIDE, SODIUM LACTATE AND CALCIUM CHLORIDE: 600; 310; 30; 20 INJECTION, SOLUTION INTRAVENOUS at 13:04

## 2024-08-07 RX ADMIN — PHENYLEPHRINE HYDROCHLORIDE 0.5 MCG/KG/MIN: 10 INJECTION INTRAVENOUS at 13:50

## 2024-08-07 RX ADMIN — FENTANYL CITRATE 100 MCG: 50 INJECTION INTRAMUSCULAR; INTRAVENOUS at 13:19

## 2024-08-07 RX ADMIN — DEXMEDETOMIDINE HYDROCHLORIDE 4 MCG: 100 INJECTION, SOLUTION INTRAVENOUS at 13:40

## 2024-08-07 RX ADMIN — PROPOFOL 150 MG: 10 INJECTION, EMULSION INTRAVENOUS at 13:19

## 2024-08-07 RX ADMIN — DEXMEDETOMIDINE HYDROCHLORIDE 4 MCG: 100 INJECTION, SOLUTION INTRAVENOUS at 14:00

## 2024-08-07 RX ADMIN — DEXMEDETOMIDINE HYDROCHLORIDE 4 MCG: 100 INJECTION, SOLUTION INTRAVENOUS at 13:50

## 2024-08-07 RX ADMIN — KETOROLAC TROMETHAMINE 30 MG: 30 INJECTION, SOLUTION INTRAMUSCULAR at 14:56

## 2024-08-07 RX ADMIN — DEXMEDETOMIDINE HYDROCHLORIDE 4 MCG: 100 INJECTION, SOLUTION INTRAVENOUS at 13:55

## 2024-08-07 RX ADMIN — GLYCOPYRROLATE 0.2 MG: 0.2 INJECTION, SOLUTION INTRAMUSCULAR; INTRAVENOUS at 13:24

## 2024-08-07 RX ADMIN — PROPOFOL 50 MG: 10 INJECTION, EMULSION INTRAVENOUS at 13:23

## 2024-08-07 RX ADMIN — SUGAMMADEX 150 MG: 100 INJECTION, SOLUTION INTRAVENOUS at 15:08

## 2024-08-07 ASSESSMENT — ACTIVITIES OF DAILY LIVING (ADL)
ADLS_ACUITY_SCORE: 36
ADLS_ACUITY_SCORE: 36
ADLS_ACUITY_SCORE: 35
ADLS_ACUITY_SCORE: 36
ADLS_ACUITY_SCORE: 36
ADLS_ACUITY_SCORE: 33
ADLS_ACUITY_SCORE: 36

## 2024-08-07 NOTE — ANESTHESIA CARE TRANSFER NOTE
Patient: Dione Wilson    Procedure: Procedure(s):  Bilateral lumbar 5 to sacral 1 foraminotomy       Diagnosis: Lumbar foraminal stenosis [M48.061]  Diagnosis Additional Information: No value filed.    Anesthesia Type:   General     Note:    Oropharynx: oropharynx clear of all foreign objects and spontaneously breathing  Level of Consciousness: drowsy  Oxygen Supplementation: nasal cannula  Level of Supplemental Oxygen (L/min / FiO2): 2  Independent Airway: airway patency satisfactory and stable  Dentition: dentition unchanged  Vital Signs Stable: post-procedure vital signs reviewed and stable  Report to RN Given: handoff report given  Patient transferred to: PACU  Comments: Patient breathing spontaneously.  Follows commands.  Suctioned and extubated.  Exchanging air well.  Transferred to PACU with 2L O2 via NC.  Monitors on.  VSS.  Patent IV.  Report and transfer of care to RN.    Handoff Report: Identifed the Patient, Identified the Reponsible Provider, Reviewed the pertinent medical history, Discussed the surgical course, Reviewed Intra-OP anesthesia mangement and issues during anesthesia, Set expectations for post-procedure period and Allowed opportunity for questions and acknowledgement of understanding      Vitals:  Vitals Value Taken Time   /70 08/07/24 1600   Temp 36.1  C (96.9  F) 08/07/24 1600   Pulse 81 08/07/24 1608   Resp 18 08/07/24 1608   SpO2 95 % 08/07/24 1608   Vitals shown include unfiled device data.    Electronically Signed By: GRAHAM Butler CRNA  August 7, 2024  4:09 PM

## 2024-08-07 NOTE — DISCHARGE INSTRUCTIONS
Today you were given 975 mg of Tylenol at 11:30 am. The recommended daily maximum dose is 4000 mg.    Today you received Toradol, an antiinflammatory medication similar to Ibuprofen.  You should not take other antiinflammatory medication, such as Ibuprofen, Motrin, Advil, Aleve, Naprosyn, etc until 9pm.     Same Day Surgery Discharge Instructions for  Sedation and General Anesthesia     It's not unusual to feel dizzy, light-headed or faint for up to 24 hours after surgery or while taking pain medication.  If you have these symptoms: sit for a few minutes before standing and have someone assist you when you get up to walk or use the bathroom.    You should rest and relax for the next 24 hours. We recommend you make arrangements to have an adult stay with you for at least 24 hours after your discharge.  Avoid hazardous and strenuous activity.    DO NOT DRIVE any vehicle or operate mechanical equipment for 24 hours following the end of your surgery.  Even though you may feel normal, your reactions may be affected by the medication you have received.    Do not drink alcoholic beverages for 24 hours following surgery.     Slowly progress to your regular diet as you feel able. It's not unusual to feel nauseated and/or vomit after receiving anesthesia.  If you develop these symptoms, drink clear liquids (apple juice, ginger ale, broth, 7-up, etc. ) until you feel better.  If your nausea and vomiting persists for 24 hours, please notify your surgeon.      All narcotic pain medications, along with inactivity and anesthesia, can cause constipation. Drinking plenty of liquids and increasing fiber intake will help.    For any questions of a medical nature, call your surgeon.    Do not make important decisions for 24 hours.    If you had general anesthesia, you may have a sore throat for a couple of days related to the breathing tube used during surgery.  You may use Cepacol lozenges to help with this discomfort.  If it worsens  or if you develop a fever, contact your surgeon.     If you feel your pain is not well managed with the pain medications prescribed by your surgeon, please contact your surgeon's office to let them know so they can address your concerns.      **If you have questions or concerns about your procedure, call   Dr. Alatorre at [phone].**

## 2024-08-07 NOTE — ANESTHESIA PROCEDURE NOTES
Airway       Patient location during procedure: OR       Procedure Start/Stop Times: 8/7/2024 1:23 PM  Staff -        CRNA: Michael Pavon APRN CRNA       Performed By: CRNA  Consent for Airway        Urgency: elective  Indications and Patient Condition       Indications for airway management: shalini-procedural       Induction type:intravenous       Mask difficulty assessment: 1 - vent by mask    Final Airway Details       Final airway type: endotracheal airway       Successful airway: Oral  Endotracheal Airway Details        ETT size (mm): 6.5       Cuffed: yes       Successful intubation technique: video laryngoscopy       VL Blade Size: Glidescope 3       Grade View of Cords: 1       Adjucts: stylet       Position: Right       Measured from: gums/teeth       Secured at (cm): 20       Bite block used: Oral Airway    Post intubation assessment        Placement verified by: capnometry, equal breath sounds and chest rise        Number of attempts at approach: 1       Number of other approaches attempted: 0       Secured with: tape       Ease of procedure: easy       Dentition: Intact and Unchanged       Dental guard used and removed.    Medication(s) Administered   Medication Administration Time: 8/7/2024 1:23 PM    Additional Comments       .  .  Smooth IV induction.  Grade 1 view quickly and easily obtained under VL  Neck neutral throughout induction / intubation sequence.  Tube placement noted to be slightly main-stemmed after patient turned prone.  Patient safely lifted out of the prone view and tube placement adjusted.  Patient tolerated well without any complications.  .  .

## 2024-08-07 NOTE — ANESTHESIA POSTPROCEDURE EVALUATION
Patient: Dione Wilson    Procedure: Procedure(s):  Bilateral lumbar 5 to sacral 1 foraminotomy       Anesthesia Type:  General    Note:  Disposition: Outpatient   Postop Pain Control: Uneventful            Sign Out: Well controlled pain   PONV: No   Neuro/Psych: Uneventful            Sign Out: Acceptable/Baseline neuro status   Airway/Respiratory: Uneventful            Sign Out: Acceptable/Baseline resp. status   CV/Hemodynamics: Uneventful            Sign Out: Acceptable CV status; No obvious hypovolemia; No obvious fluid overload   Other NRE: NONE   DID A NON-ROUTINE EVENT OCCUR? No           Last vitals:  Vitals Value Taken Time   /61 08/07/24 1530   Temp     Pulse 82 08/07/24 1531   Resp 16 08/07/24 1531   SpO2 96 % 08/07/24 1531   Vitals shown include unfiled device data.    Electronically Signed By: Joslyn Nava MD  August 7, 2024  3:33 PM

## 2024-08-07 NOTE — OR NURSING
VSS. A&O. Pain 4/10, tolerable per pt. Incisional dressing CDI. Dnay PO, denies n/v. DC instructions, meds and follow up reviewed with patient and . Questions answered, able to teachback. DC to home with .

## 2024-08-07 NOTE — ANESTHESIA PREPROCEDURE EVALUATION
Anesthesia Pre-Procedure Evaluation    Patient: Dione Wilson   MRN: [id number] : 1955        Procedure : Procedure(s):  Bilateral lumbar 5 to sacral 1 foraminotomy          Past Medical History:   Diagnosis Date    Coronary artery disease involving native coronary artery of native heart without angina pectoris     cardiac cath 2020: mild non-obstructive disease    Daily headache     Recurrent cold sores     Rheumatoid arthritis, rheumatoid factor status unknown (H)       Past Surgical History:   Procedure Laterality Date    COLONOSCOPY      COLONOSCOPY N/A 2016    Procedure: COLONOSCOPY;  Surgeon: Philip Santiago MD;  Location:  GI    COLPORRHAPHY N/A 2015    Procedure: UTEROSACRAL LIGAMENT SUSPENSION PLICATION,  ANTERIOR POSTERIOR POSSIBLE REPAIR;  Surgeon: Diana Casper MD;  Location: [facility] [place];  Service:     CV LEFT HEART CATH N/A 2020    Procedure: Left Heart Cath;  Surgeon: Aleksander López MD;  Location:  HEART CARDIAC CATH LAB    CV LEFT VENTRICULOGRAM N/A 2020    Procedure: Left Ventriculogram;  Surgeon: Aleksander López MD;  Location:  HEART CARDIAC CATH LAB    DILATION AND CURETTAGE      ENT SURGERY      T&A    GYN SURGERY      vag hyst    PELVIC LAPAROSCOPY      DC VAGINAL HYSTERECTOMY,UTERUS 250 GMS/< N/A 2015    Procedure: VAGINAL HYSTERECTOMY RIGHT SALPINGO O-OPHERECTOMY POSTERIOR REPAIR UTEROSACRAL LIGAMENT SUSPENSION CYSTOSCOPY TRANSVAGINAL TAPE OBTURATOR;  Surgeon: Diana Casper MD;  Location: [facility] [place];  Service: Gynecology    TONSILLECTOMY        Allergies   Allergen Reactions    Minocycline      Daughter and son reaction    Tetracycline      Daughter and son allergy      Social History     Tobacco Use    Smoking status: Never    Smokeless tobacco: Never   Substance Use Topics    Alcohol use: Yes     Comment: 2-4 per week      Wt Readings from Last 1 Encounters:   24 54 kg (119 lb)     "    Anesthesia Evaluation   Pt has had prior anesthetic. Type: General.    No history of anesthetic complications       ROS/MED HX  ENT/Pulmonary:  - neg pulmonary ROS     Neurologic:  - neg neurologic ROS     Cardiovascular:    (-) CAD   METS/Exercise Tolerance: >4 METS    Hematologic:  - neg hematologic  ROS     Musculoskeletal:   (+)  arthritis,             GI/Hepatic:  - neg GI/hepatic ROS     Renal/Genitourinary:  - neg Renal ROS     Endo: Comment: RA      Psychiatric/Substance Use:  - neg psychiatric ROS     Infectious Disease:       Malignancy:       Other:            Physical Exam    Airway        Mallampati: II   TM distance: > 3 FB   Neck ROM: full   Mouth opening: > 3 cm    Respiratory Devices and Support         Dental       (+) Completely normal teeth      Cardiovascular   cardiovascular exam normal       Rhythm and rate: regular     Pulmonary   pulmonary exam normal        breath sounds clear to auscultation           OUTSIDE LABS:  CBC:   Lab Results   Component Value Date    WBC 5.9 07/31/2024    WBC 7.7 06/17/2023    HGB 13.1 07/31/2024    HGB 13.2 06/17/2023    HCT 39.9 07/31/2024    HCT 38.7 06/17/2023     07/31/2024     06/17/2023     BMP:   Lab Results   Component Value Date     07/31/2024     06/17/2023    POTASSIUM 5.2 07/31/2024    POTASSIUM 4.2 06/17/2023    CHLORIDE 104 07/31/2024    CHLORIDE 105 06/17/2023    CO2 27 07/31/2024    CO2 21 (L) 06/17/2023    BUN 13.7 07/31/2024    BUN 13.9 06/17/2023    CR 0.61 07/31/2024    CR 0.57 06/17/2023     (H) 07/31/2024     (H) 06/26/2023     COAGS:   Lab Results   Component Value Date    PTT 34 06/17/2023    INR 1.01 06/17/2023     POC: No results found for: \"BGM\", \"HCG\", \"HCGS\"  HEPATIC:   Lab Results   Component Value Date    ALBUMIN 4.8 07/31/2024    PROTTOTAL 7.2 07/31/2024    ALT 38 07/31/2024    AST 25 07/31/2024    ALKPHOS 65 07/31/2024    BILITOTAL 0.3 07/31/2024     OTHER:   Lab Results   Component " Value Date    A1C 5.5 06/26/2023    BRANDIE 9.7 07/31/2024    TSH 1.42 06/26/2023    CRP 3.4 07/29/2021    SED 11 07/29/2021       Anesthesia Plan    ASA Status:  2    NPO Status:  NPO Appropriate    Anesthesia Type: General.     - Airway: ETT   Induction: Intravenous.   Maintenance: Balanced.        Consents    Anesthesia Plan(s) and associated risks, benefits, and realistic alternatives discussed. Questions answered and patient/representative(s) expressed understanding.     - Discussed: Risks, Benefits and Alternatives for BOTH SEDATION and the PROCEDURE were discussed     - Discussed with:  Patient      - Extended Intubation/Ventilatory Support Discussed: No.      - Patient is DNR/DNI Status: No     Use of blood products discussed: Yes.     - Discussed with: Patient.     Postoperative Care    Pain management: Multi-modal analgesia.   PONV prophylaxis: Ondansetron (or other 5HT-3), Dexamethasone or Solumedrol, Background Propofol Infusion     Comments:               Joslyn Nava MD    I have reviewed the pertinent notes and labs in the chart from the past 30 days and (re)examined the patient.  Any updates or changes from those notes are reflected in this note.             # Drug Induced Platelet Defect: home medication list includes an antiplatelet medication

## 2024-08-07 NOTE — OP NOTE
Orthopedic Surgery Operative Report    Patient:   Dione Wilson  MRN:   [id number]   :  1955  Facility: [facility]   Date:  24         PREOPERATIVE DIAGNOSIS:    Bilateral L5 radiculopathy secondary to foraminal stenosis    POSTOPERATIVE DIAGNOSIS:    Bilateral L5 radiculopathy secondary to foraminal stenosis    PROCEDURE PERFORMED:    Bilateral L5-S1 foraminotomies    SURGEON:    Marc Alatorre MD    SURGICAL ASSISTANT:    Sandra Keane PA-C     ANESTHESIA:  General    FINDINGS:    Severe foraminal stenosis right L5-S1, moderate left L5-S1    COMPLICATIONS:     None    SPECIMENS:    None    ESTIMATED BLOOD LOSS:  1 cc    IMPLANTS:    None    INDICATION FOR OPERATION:  The patient is a 68 year old female who developed bilateral L5 radicular symptoms in the setting of foraminal stenosis L5-S1.  Conservative measures were not effective in controlling her symptoms.  I discussed with her the risks, benefits, and alternatives of the above operation and she wished to proceed.    DESCRIPTION OF PROCEDURE:    The patient was met in the preoperative holding area and the operative site was confirmed and marked.  We once again reviewed the risks, benefits, and alternatives of the operation and the patient wished to proceed with the surgery.    The patient was taken back to the operating room and induced under general anesthesia.  The patient was positioned prone on the Vlad frame with all bony prominences well padded.  The surgical site was prepped and draped in the usual standard fashion.    A spinal needle was used for level localization with fluoroscopy.  Once I had localized the appropriate skin incision site for the approach to the operative level, a skin incision was made and dissection carried down to the myofascia.  I created a small opening within the deep fascia 1 cm to the right of the spinous process.  I next introduced the first dilator from the [employer]  system through this hole in the myofascia, and guided it down to lateral aspect of the right L5 pars interarticularis.  I sequentially dilated up to an 18 mm tubular retractor.  I locked the tubular retractor into place, and then once again confirmed radiographically that it was docked at the right L5 pars interarticularis.      I resected soft tissue from the pars, and then established my bony landmarks.  I created a rectangular window using a Midas cesia from lateral to medial in the pars just below the level of the facet complex to the cranial aspect of the pars, taking care to leave enough medial pars present to minimize the risk of postoperative fracture.  This exposed the cranial aspect of the S1 ascending facet, which was resected using a cesia, curette, and Kerrison rongeur thereby creating adequate room for the exiting nerve root to not be compressed.  The ligamentum flavum was not resected.  I swept a ball tipped nerve hook along the course of the foramen from the medial canal out the lateral aspect of the foramen and confirmed no ongoing dorsal-ventral stenosis over the exiting nerve root.    I achieved final hemostasis of the deep structures with bipolar cautery and Floseal.  No ongoing sites of bleeding were present.  At this point I began to withdraw my tubular retractor very slowly through each tissue layer, taking care to bipolar all active sites of bleeding as I withdrew.  When the tube was removed, there were no remaining active sites of bleeding present as confirmed by visualization of the gelfoam sam which was still uncolored by blood at the base of the operative site.    I then repeated the same process as described above on the left at L5-S1 to complete bilateral L5-S1 foraminotomies.    The deep fascia was closed with a running 0-vicyl suture in a watertight fashion, and the subcutaneous tissues were closed with 2-0 vicryl interrupted sutures.  Exofin and steri strips were applied.  The  patient was transferred off of the operative table and allowed to emerge from anesthesia.  The patient was extubated and transported to PACU in stable condition.      A surgical assistant was critical for this case to assist in retraction of the soft tissues and evacuating blood from the surgical field to facilitate a safer operation with improved visualization and less time under anesthesia.     All sponge and needle counts were correct at case conclusion.      POSTOPERATIVE PLAN:  -Activity:   Up with assist  -Weight Bearing Status: WBAT   -Bracing:   None  -Pain control:   Rx for oxycodone  -Dressing:   Ok to shower with dressing in place, dressing ok to get wet.  -Diet:    ADAT    -Disposition:   Home from PACU  -Follow up:   2 weeks in my clinic       Marc Alatorre MD

## 2024-08-13 ENCOUNTER — TRANSFERRED RECORDS (OUTPATIENT)
Dept: HEALTH INFORMATION MANAGEMENT | Facility: CLINIC | Age: 69
End: 2024-08-13
Payer: COMMERCIAL

## 2024-09-19 ENCOUNTER — HOSPITAL ENCOUNTER (OUTPATIENT)
Dept: MAMMOGRAPHY | Facility: CLINIC | Age: 69
Discharge: HOME | End: 2024-09-19
Attending: INTERNAL MEDICINE | Admitting: INTERNAL MEDICINE
Payer: COMMERCIAL

## 2024-09-19 DIAGNOSIS — Z12.31 VISIT FOR SCREENING MAMMOGRAM: ICD-10-CM

## 2024-09-19 PROCEDURE — 77063 BREAST TOMOSYNTHESIS BI: CPT

## 2024-10-02 SDOH — HEALTH STABILITY: PHYSICAL HEALTH: ON AVERAGE, HOW MANY MINUTES DO YOU ENGAGE IN EXERCISE AT THIS LEVEL?: 60 MIN

## 2024-10-02 SDOH — HEALTH STABILITY: PHYSICAL HEALTH: ON AVERAGE, HOW MANY DAYS PER WEEK DO YOU ENGAGE IN MODERATE TO STRENUOUS EXERCISE (LIKE A BRISK WALK)?: 3 DAYS

## 2024-10-02 ASSESSMENT — SOCIAL DETERMINANTS OF HEALTH (SDOH): HOW OFTEN DO YOU GET TOGETHER WITH FRIENDS OR RELATIVES?: TWICE A WEEK

## 2024-10-07 ENCOUNTER — TRANSFERRED RECORDS (OUTPATIENT)
Dept: MULTI SPECIALTY CLINIC | Facility: CLINIC | Age: 69
End: 2024-10-07

## 2024-10-07 ENCOUNTER — OFFICE VISIT (OUTPATIENT)
Dept: FAMILY MEDICINE | Facility: CLINIC | Age: 69
End: 2024-10-07
Payer: COMMERCIAL

## 2024-10-07 VITALS
WEIGHT: 122 LBS | RESPIRATION RATE: 16 BRPM | HEIGHT: 60 IN | OXYGEN SATURATION: 99 % | DIASTOLIC BLOOD PRESSURE: 69 MMHG | TEMPERATURE: 97.4 F | BODY MASS INDEX: 23.95 KG/M2 | HEART RATE: 71 BPM | SYSTOLIC BLOOD PRESSURE: 108 MMHG

## 2024-10-07 DIAGNOSIS — M54.17 LUMBOSACRAL RADICULOPATHY AT L5: ICD-10-CM

## 2024-10-07 DIAGNOSIS — Z78.0 MENOPAUSE: ICD-10-CM

## 2024-10-07 DIAGNOSIS — R73.03 PREDIABETES: ICD-10-CM

## 2024-10-07 DIAGNOSIS — M06.9 RHEUMATOID ARTHRITIS, RHEUMATOID FACTOR STATUS UNKNOWN (H): ICD-10-CM

## 2024-10-07 DIAGNOSIS — I25.10 CORONARY ARTERY DISEASE INVOLVING NATIVE CORONARY ARTERY OF NATIVE HEART WITHOUT ANGINA PECTORIS: ICD-10-CM

## 2024-10-07 DIAGNOSIS — Z00.00 ENCOUNTER FOR ANNUAL WELLNESS VISIT (AWV) IN MEDICARE PATIENT: Primary | ICD-10-CM

## 2024-10-07 DIAGNOSIS — D62 ANEMIA DUE TO BLOOD LOSS, ACUTE: ICD-10-CM

## 2024-10-07 DIAGNOSIS — N76.0 VAGINITIS AND VULVOVAGINITIS: ICD-10-CM

## 2024-10-07 DIAGNOSIS — E78.5 HYPERLIPIDEMIA LDL GOAL <100: ICD-10-CM

## 2024-10-07 LAB
ANION GAP SERPL CALCULATED.3IONS-SCNC: 10 MMOL/L (ref 7–15)
BUN SERPL-MCNC: 10.6 MG/DL (ref 8–23)
CALCIUM SERPL-MCNC: 9.7 MG/DL (ref 8.8–10.4)
CHLORIDE SERPL-SCNC: 104 MMOL/L (ref 98–107)
CREAT SERPL-MCNC: 0.6 MG/DL (ref 0.51–0.95)
EGFRCR SERPLBLD CKD-EPI 2021: >90 ML/MIN/1.73M2
ERYTHROCYTE [DISTWIDTH] IN BLOOD BY AUTOMATED COUNT: 13.4 % (ref 10–15)
EST. AVERAGE GLUCOSE BLD GHB EST-MCNC: 108 MG/DL
GLUCOSE SERPL-MCNC: 92 MG/DL (ref 70–99)
HBA1C MFR BLD: 5.4 % (ref 0–5.6)
HCO3 SERPL-SCNC: 27 MMOL/L (ref 22–29)
HCT VFR BLD AUTO: 37.8 % (ref 35–47)
HGB BLD-MCNC: 12.1 G/DL (ref 11.7–15.7)
IRON BINDING CAPACITY (ROCHE): 281 UG/DL (ref 240–430)
IRON SATN MFR SERPL: 49 % (ref 15–46)
IRON SERPL-MCNC: 138 UG/DL (ref 37–145)
MCH RBC QN AUTO: 31.1 PG (ref 26.5–33)
MCHC RBC AUTO-ENTMCNC: 32 G/DL (ref 31.5–36.5)
MCV RBC AUTO: 97 FL (ref 78–100)
PAP SMEAR - HIM PATIENT REPORTED: NEGATIVE
PLATELET # BLD AUTO: 316 10E3/UL (ref 150–450)
POTASSIUM SERPL-SCNC: 4.5 MMOL/L (ref 3.4–5.3)
RBC # BLD AUTO: 3.89 10E6/UL (ref 3.8–5.2)
SODIUM SERPL-SCNC: 141 MMOL/L (ref 135–145)
WBC # BLD AUTO: 5.5 10E3/UL (ref 4–11)

## 2024-10-07 PROCEDURE — 99214 OFFICE O/P EST MOD 30 MIN: CPT | Mod: 25 | Performed by: INTERNAL MEDICINE

## 2024-10-07 PROCEDURE — 83540 ASSAY OF IRON: CPT | Performed by: INTERNAL MEDICINE

## 2024-10-07 PROCEDURE — 83036 HEMOGLOBIN GLYCOSYLATED A1C: CPT | Performed by: INTERNAL MEDICINE

## 2024-10-07 PROCEDURE — 36415 COLL VENOUS BLD VENIPUNCTURE: CPT | Performed by: INTERNAL MEDICINE

## 2024-10-07 PROCEDURE — 99397 PER PM REEVAL EST PAT 65+ YR: CPT | Performed by: INTERNAL MEDICINE

## 2024-10-07 PROCEDURE — 80048 BASIC METABOLIC PNL TOTAL CA: CPT | Performed by: INTERNAL MEDICINE

## 2024-10-07 PROCEDURE — 83550 IRON BINDING TEST: CPT | Performed by: INTERNAL MEDICINE

## 2024-10-07 PROCEDURE — 85027 COMPLETE CBC AUTOMATED: CPT | Performed by: INTERNAL MEDICINE

## 2024-10-07 ASSESSMENT — PAIN SCALES - GENERAL: PAINLEVEL: MODERATE PAIN (5)

## 2024-10-07 NOTE — PROGRESS NOTES
Preventive Care Visit  [facility] ANGELES Callahan MD, Internal Medicine  Oct 7, 2024      Assessment & Plan     Encounter for annual wellness visit (AWV) in Medicare patient  This very nice 68 years old woman is here for annual wellness visit  She has Pap smear scheduled with her GYN today  She cannot stop doing them because of her immune compromised  She is up-to-date on vaccinations also  Mammogram and DEXA scan and colon exam      Rheumatoid arthritis, rheumatoid factor status unknown (H)  Patient is on Plaquenil, methotrexate, adalimumab  Since the last medication was given instead of Humira she has vaginal symptoms  - CBC with Platelets    - Basic metabolic panel    Lumbosacral radiculopathy at L5  S/p surgery and we will check a CBC and iron levels because of blood loss    Hyperlipidemia LDL goal <100  Patient is on Crestor    Menopause  Patient seems to have some vaginitis symptoms and she will see GYN today    wet prep, KOH and Pap smear all should be done    If negative, she should take vaginal estrogens    Coronary artery disease involving native coronary artery of native heart without angina pectoris  Mild to moderate disease on baby aspirin and statins asymptomatic  - Basic metabolic panel    Prediabetes  We will check A1c specially in view of the vaginitis  - Basic metabolic panel    Anemia due to blood loss, acute    - Iron & Iron Binding Capacity    Vaginitis and vulvovaginitis  As above  - Hemoglobin A1c              Counseling  Appropriate preventive services were addressed with this patient via screening, questionnaire, or discussion as appropriate for fall prevention, nutrition, physical activity,   Checklist reviewing preventive services available has been given to the patient.  Reviewed patient's diet, addressing concerns and/or questions.   She is at risk for lack of exercise and has been provided with information to increase physical activity for the benefit of her  well-being.   She is at risk for psychosocial distress and has been provided with information to reduce risk.   Discussed possible causes of fatigue. Updated plan of care.  Patient reported difficulty with activities of daily living were addressed today.Patient reported safety concerns were addressed today.Information on urinary incontinence and treatment options given to patient.           Corrina Parham is a 68 year old, presenting for the following:  Physical (fasting)        Health Care Directive  Patient does not have a Health Care Directive or Living Will: Patient will bring a copy    HPI  This extremely nice 68 years old woman is physically very active  In August she had back surgery at L4-5 and her pain has improved  Her rheumatoid arthritis is stable but her Humira was changed to adalimumab  Since that time she has noticed more vaginal redness and dry area  [place] is painful and she has itching          10/2/2024   General Health   How would you rate your overall physical health? (!) FAIR   Feel stress (tense, anxious, or unable to sleep) Only a little      (!) STRESS CONCERN      10/2/2024   Nutrition   Diet: Carbohydrate counting            10/2/2024   Exercise   Days per week of moderate/strenous exercise 3 days   Average minutes spent exercising at this level 60 min            10/2/2024   Social Factors   Frequency of gathering with friends or relatives Twice a week   Worry food won't last until get money to buy more No   Food not last or not have enough money for food? No   Do you have housing? (Housing is defined as stable permanent housing and does not include staying ouside in a car, in a tent, in an abandoned building, in an overnight shelter, or couch-surfing.) Yes   Are you worried about losing your housing? No   Lack of transportation? No   Unable to get utilities (heat,electricity)? No            10/7/2024   Fall Risk   Gait Speed Test (Document in seconds) 4.35   Gait Speed Test  Interpretation Less than or equal to 5.00 seconds - PASS               10/2/2024   Activities of Daily Living- Home Safety   Needs help with the following daily activites Housework   Safety concerns in the home Throw rugs in the hallway            10/2/2024   Dental   Dentist two times every year? Yes            10/2/2024   Hearing Screening   Hearing concerns? None of the above            10/2/2024   Driving Risk Screening   Patient/family members have concerns about driving No            10/2/2024   General Alertness/Fatigue Screening   Have you been more tired than usual lately? (!) YES            10/2/2024   Urinary Incontinence Screening   Bothered by leaking urine in past 6 months Yes            10/2/2024   TB Screening   Were you born outside of the [place]? No                  10/2/2024   Substance Use   Alcohol more than 3/day or more than 7/wk No   Do you have a current opioid prescription? No   How severe/bad is pain from 1 to 10? 5/10   Do you use any other substances recreationally? No        Social History     Tobacco Use    Smoking status: Never    Smokeless tobacco: Never   Vaping Use    Vaping status: Never Used   Substance Use Topics    Alcohol use: Yes     Comment: 2-4 per week    Drug use: No           9/19/2024   LAST FHS-7 RESULTS   1st degree relative breast or ovarian cancer No   Any relative bilateral breast cancer No   Any male have breast cancer No   Any ONE woman have BOTH breast AND ovarian cancer Yes   Any woman with breast cancer before 50yrs No   2 or more relatives with breast AND/OR ovarian cancer No   2 or more relatives with breast AND/OR bowel cancer No           Mammogram Screening - Mammogram every 1-2 years updated in Health Maintenance based on mutual decision making      History of abnormal Pap smear: No - age 21-29 PAP every 3 years recommended       ASCVD Risk   The ASCVD Risk score (Juma CORTES, et al., 2019) failed to calculate for the following reasons:    The valid  HDL cholesterol range is 20 to 100 mg/dL            Reviewed and updated as needed this visit by Provider                    BP Readings from Last 3 Encounters:   10/07/24 108/69   08/07/24 121/83   07/31/24 105/69    Wt Readings from Last 3 Encounters:   10/07/24 55.3 kg (122 lb)   08/07/24 54 kg (119 lb)   07/31/24 54 kg (119 lb)                  Patient Active Problem List   Diagnosis    CARDIOVASCULAR SCREENING; LDL GOAL LESS THAN 160    Status post coronary angiogram    Coronary artery disease involving native coronary artery of native heart without angina pectoris    Rheumatoid arthritis, rheumatoid factor status unknown (H)    Recurrent cold sores    Daily headache    Family hx colonic polyps     Past Surgical History:   Procedure Laterality Date    COLONOSCOPY      COLONOSCOPY N/A 12/6/2016    Procedure: COLONOSCOPY;  Surgeon: Philip Santiago MD;  Location:  GI    COLPORRHAPHY N/A 4/7/2015    Procedure: UTEROSACRAL LIGAMENT SUSPENSION PLICATION,  ANTERIOR POSTERIOR POSSIBLE REPAIR;  Surgeon: Diana Casper MD;  Location: [facility] [place];  Service:     CV LEFT HEART CATH N/A 12/8/2020    Procedure: Left Heart Cath;  Surgeon: Aleksander López MD;  Location:  HEART CARDIAC CATH LAB    CV LEFT VENTRICULOGRAM N/A 12/8/2020    Procedure: Left Ventriculogram;  Surgeon: Aleksander López MD;  Location:  HEART CARDIAC CATH LAB    DILATION AND CURETTAGE      ENT SURGERY      T&A    GYN SURGERY      vag hyst    LAMINECTOMY LUMBAR ONE LEVEL Bilateral 8/7/2024    Procedure: Bilateral lumbar 5 to sacral 1 foraminotomy;  Surgeon: Marc Alatorre MD;  Location:  OR    PELVIC LAPAROSCOPY      AL VAGINAL HYSTERECTOMY,UTERUS 250 GMS/< N/A 4/7/2015    Procedure: VAGINAL HYSTERECTOMY RIGHT SALPINGO O-OPHERECTOMY POSTERIOR REPAIR UTEROSACRAL LIGAMENT SUSPENSION CYSTOSCOPY TRANSVAGINAL TAPE OBTURATOR;  Surgeon: Diana Casper MD;  Location: [facility] [place];  Service: Gynecology     TONSILLECTOMY         Social History     Tobacco Use    Smoking status: Never    Smokeless tobacco: Never   Substance Use Topics    Alcohol use: Yes     Comment: 2-4 per week     Family History   Problem Relation Age of Onset    Peptic Ulcer Disease Mother     Hypertension Mother     Osteoporosis Mother     Diabetes Father     Cancer Father         biliary    Other Cancer Father     Crohn's Disease Brother     Diabetes Brother     Coronary Artery Disease Brother     Hypertension Brother     Coronary Artery Disease Brother 60    Breast Cancer Maternal Aunt     Breast Cancer Other         Aunt         Current Outpatient Medications   Medication Sig Dispense Refill    Acetaminophen (TYLENOL 8 HOUR PO) Take 1-2 tablets by mouth 3 times daily as needed      Adalimumab-adaz (HYRIMOZ) 40 MG/0.4ML SOAJ Inject subcutaneously every 14 days      aspirin 81 MG EC tablet Take 1 tablet (81 mg) by mouth daily      Calcium 250 MG CAPS Take 4 capsules by mouth 2 times daily      estradiol (ESTRACE) 0.5 MG tablet Take 1 tablet (0.5 mg) by mouth daily 90 tablet 3    folic acid (FOLVITE) 1 MG tablet Take 2 mg by mouth 2 times daily (2 x 1mg = 2mg)      hydroxychloroquine (PLAQUENIL) 200 MG tablet Take 200 mg by mouth daily.      ibuprofen (ADVIL/MOTRIN) 200 MG tablet Take 200-400 mg by mouth every 4 hours as needed      methotrexate 2.5 MG tablet Take 15 mg by mouth once a week (6 x 2.5mg = 15mg:Wednesdays)      pseudoePHEDrine (SUDAFED) 60 MG tablet Take 60 mg by mouth every morning      rosuvastatin (CRESTOR) 5 MG tablet Take 1 tablet (5 mg) by mouth daily 90 tablet 3    vitamin B1 (THIAMINE) 50 MG tablet TAKE 1 TABLET BY MOUTH EVERY  tablet 1    vitamin D3 (CHOLECALCIFEROL) 50 mcg (2000 units) tablet Take 1 tablet by mouth three times a week (Monday, Wednesday & Fridays)       Allergies   Allergen Reactions    Minocycline      Daughter and son reaction    Tetracycline      Daughter and son allergy     Recent Labs   Lab Test  07/31/24  1132 06/26/23  1106 06/17/23  1312 01/12/23  1241 01/10/22  1003 07/29/21  1005 03/16/21  1236 01/14/21  0000 08/04/20  0000 01/21/19  0000   A1C  --  5.5  --   --   --   --   --   --   --  5.5   LDL 81 77  --  89   < >  --   --   --    < >  --     83  --  96   < >  --   --   --    < >  --    TRIG 78 59  --  66   < >  --   --   --    < >  --    ALT 38  --   --  29  --  27 37 19   < >  --    CR 0.61  --  0.57 0.68   < > 0.70 0.64 0.54*   < >  --    GFRESTIMATED >90  --  >90 >90   < > >90 >90 100   < >  --    GFRESTBLACK  --   --   --   --   --   --  >90 115   < >  --    POTASSIUM 5.2  --  4.2 4.5   < > 5.1 4.0 4.2   < >  --    TSH  --  1.42  --   --   --   --   --  1.860  --  0.994    < > = values in this interval not displayed.      Current providers sharing in care for this patient include:  Patient Care Team:  Rani Callahan MD as PCP - General (Internal Medicine)  Sara Stack MD as MD (Rheumatology)  Rani Callahan MD as Assigned PCP    The following health maintenance items are reviewed in Epic and correct as of today:  Health Maintenance   Topic Date Due    MEDICARE ANNUAL WELLNESS VISIT  10/02/2024    PAP  02/01/2025    COVID-19 Vaccine (10 - 2024-25 season) 11/14/2024    CMP  07/31/2025    LIPID  07/31/2025    ANNUAL REVIEW OF HM ORDERS  07/31/2025    CBC  07/31/2025    FALL RISK ASSESSMENT  10/07/2025    MAMMO SCREENING  09/19/2026    COLORECTAL CANCER SCREENING  12/06/2026    GLUCOSE  07/31/2027    ADVANCE CARE PLANNING  07/31/2029    DTAP/TDAP/TD IMMUNIZATION (3 - Td or Tdap) 02/20/2030    DEXA  11/29/2031    PHQ-2 (once per calendar year)  Completed    INFLUENZA VACCINE  Completed    Pneumococcal Vaccine: 65+ Years  Completed    ZOSTER IMMUNIZATION  Completed    RSV VACCINE  Completed    HPV IMMUNIZATION  Aged Out    MENINGITIS IMMUNIZATION  Aged Out    RSV MONOCLONAL ANTIBODY  Aged Out    HEPATITIS C SCREENING  Discontinued         Review of Systems  Constitutional, HEENT,  "cardiovascular, pulmonary, GI, , musculoskeletal, neuro, skin, endocrine and psych systems are negative, except as otherwise noted.     Objective    Exam  /69 (BP Location: Right arm, Patient Position: Sitting, Cuff Size: Adult Regular)   Pulse 71   Temp 97.4  F (36.3  C)   Resp 16   Ht 1.536 m (5' 0.47\")   Wt 55.3 kg (122 lb)   SpO2 99%   BMI 23.46 kg/m     Estimated body mass index is 23.46 kg/m  as calculated from the following:    Height as of this encounter: 1.536 m (5' 0.47\").    Weight as of this encounter: 55.3 kg (122 lb).    Physical Exam  GENERAL: alert and no distress  EYES: Eyes grossly normal to inspection, PERRL and conjunctivae and sclerae normal  HENT: ear canals and TM's normal, nose and mouth without ulcers or lesions  NECK: no adenopathy, no asymmetry, masses, or scars  RESP: lungs clear to auscultation - no rales, rhonchi or wheezes  BREAST: normal without masses, tenderness or nipple discharge and no palpable axillary masses or adenopathy  CV: regular rate and rhythm, normal S1 S2, no S3 or S4, no murmur, click or rub, no peripheral edema  ABDOMEN: soft, nontender, no hepatosplenomegaly, no masses and bowel sounds normal  MS: no gross musculoskeletal defects noted, no edema  SKIN: Benign skin lesions no suspicious lesions or rashes  NEURO: Normal strength and tone, mentation intact and speech normal  PSYCH: mentation appears normal, affect normal/bright         10/7/2024   Mini Cog   Clock Draw Score 2 Normal   3 Item Recall 3 objects recalled   Mini Cog Total Score 5                 Signed Electronically by: Rani Callahan MD    "

## 2024-11-01 DIAGNOSIS — Z78.0 MENOPAUSE: ICD-10-CM

## 2024-11-01 RX ORDER — ESTRADIOL 0.5 MG/1
0.5 TABLET ORAL DAILY
Qty: 90 TABLET | Refills: 3 | OUTPATIENT
Start: 2024-11-01

## 2024-11-08 DIAGNOSIS — E78.5 HYPERLIPIDEMIA LDL GOAL <100: ICD-10-CM

## 2024-11-11 RX ORDER — ATORVASTATIN CALCIUM 10 MG/1
10 TABLET, FILM COATED ORAL DAILY
Qty: 90 TABLET | Refills: 3 | Status: SHIPPED | OUTPATIENT
Start: 2024-11-11

## 2024-11-11 NOTE — TELEPHONE ENCOUNTER
Message from pharmacy: rosuvastatin (CRESTOR) 5 MG tablet DRUG NOT COVERED BY PT PLAN. ALTERNATIVES: SIMVASTATIN, ATORVASTATIN    Bia ROWELL [facility] Triage Team

## 2024-11-11 NOTE — TELEPHONE ENCOUNTER
Called patient and relayed message. Patient stated that she talked to the insurance company and they stated that they will cover the rosuvastatin if it [facility]. Patient would like to stay on rosuvastatin. Medication and pharmacy is pended.     Bia MAJOR RN  [facility] Triage Team

## 2024-11-12 RX ORDER — ROSUVASTATIN CALCIUM 5 MG/1
5 TABLET, COATED ORAL DAILY
Qty: 90 TABLET | Refills: 3 | Status: SHIPPED | OUTPATIENT
Start: 2024-11-12

## 2024-12-02 DIAGNOSIS — G45.4 TRANSIENT GLOBAL AMNESIA: ICD-10-CM

## 2024-12-02 RX ORDER — THIAMINE HCL 50 MG
50 TABLET ORAL DAILY
Qty: 100 TABLET | Refills: 1 | Status: SHIPPED | OUTPATIENT
Start: 2024-12-02

## 2024-12-28 DIAGNOSIS — Z78.0 MENOPAUSE: ICD-10-CM

## 2024-12-30 RX ORDER — ESTRADIOL 0.5 MG/1
0.5 TABLET ORAL DAILY
Qty: 90 TABLET | Refills: 2 | Status: SHIPPED | OUTPATIENT
Start: 2024-12-30

## 2025-01-06 NOTE — LETTER
October 7, 2024      Dione Wilson  [address]  [place] [address]        To Whom It May Concern,       Patient has recurrent vaginal infections.  I would suggest to go back on Humira instead of Adalimumab.      Sincerely,        Rani Callahan MD           /

## 2025-02-13 ENCOUNTER — OFFICE VISIT (OUTPATIENT)
Dept: FAMILY MEDICINE | Facility: CLINIC | Age: 70
End: 2025-02-13
Payer: COMMERCIAL

## 2025-02-13 VITALS
BODY MASS INDEX: 24.48 KG/M2 | WEIGHT: 124.7 LBS | TEMPERATURE: 96.4 F | HEART RATE: 71 BPM | SYSTOLIC BLOOD PRESSURE: 125 MMHG | RESPIRATION RATE: 18 BRPM | DIASTOLIC BLOOD PRESSURE: 71 MMHG | HEIGHT: 60 IN | OXYGEN SATURATION: 97 %

## 2025-02-13 DIAGNOSIS — M48.062 SPINAL STENOSIS OF LUMBAR REGION WITH NEUROGENIC CLAUDICATION: ICD-10-CM

## 2025-02-13 DIAGNOSIS — I25.10 CORONARY ARTERY DISEASE INVOLVING NATIVE CORONARY ARTERY OF NATIVE HEART WITHOUT ANGINA PECTORIS: ICD-10-CM

## 2025-02-13 DIAGNOSIS — M85.89 OSTEOPENIA OF MULTIPLE SITES: ICD-10-CM

## 2025-02-13 DIAGNOSIS — H25.013 CORTICAL AGE-RELATED CATARACT OF BOTH EYES: ICD-10-CM

## 2025-02-13 DIAGNOSIS — E78.5 HYPERLIPIDEMIA LDL GOAL <70: ICD-10-CM

## 2025-02-13 DIAGNOSIS — M06.9 RHEUMATOID ARTHRITIS, RHEUMATOID FACTOR STATUS UNKNOWN (H): ICD-10-CM

## 2025-02-13 DIAGNOSIS — Z01.818 PREOP GENERAL PHYSICAL EXAM: Primary | ICD-10-CM

## 2025-02-13 PROBLEM — Z98.890 STATUS POST CORONARY ANGIOGRAM: Status: RESOLVED | Noted: 2020-12-08 | Resolved: 2025-02-13

## 2025-02-13 LAB
ALBUMIN SERPL BCG-MCNC: 4.5 G/DL (ref 3.5–5.2)
ALP SERPL-CCNC: 66 U/L (ref 40–150)
ALT SERPL W P-5'-P-CCNC: 24 U/L (ref 0–50)
ANION GAP SERPL CALCULATED.3IONS-SCNC: 11 MMOL/L (ref 7–15)
AST SERPL W P-5'-P-CCNC: 26 U/L (ref 0–45)
BILIRUB SERPL-MCNC: 0.3 MG/DL
BUN SERPL-MCNC: 14.1 MG/DL (ref 8–23)
CALCIUM SERPL-MCNC: 10.1 MG/DL (ref 8.8–10.4)
CHLORIDE SERPL-SCNC: 103 MMOL/L (ref 98–107)
CHOLEST SERPL-MCNC: 204 MG/DL
CREAT SERPL-MCNC: 0.71 MG/DL (ref 0.51–0.95)
EGFRCR SERPLBLD CKD-EPI 2021: >90 ML/MIN/1.73M2
FASTING STATUS PATIENT QL REPORTED: YES
FASTING STATUS PATIENT QL REPORTED: YES
GLUCOSE SERPL-MCNC: 92 MG/DL (ref 70–99)
HCO3 SERPL-SCNC: 27 MMOL/L (ref 22–29)
HDLC SERPL-MCNC: 90 MG/DL
LDLC SERPL CALC-MCNC: 101 MG/DL
NONHDLC SERPL-MCNC: 114 MG/DL
POTASSIUM SERPL-SCNC: 5 MMOL/L (ref 3.4–5.3)
PROT SERPL-MCNC: 6.7 G/DL (ref 6.4–8.3)
SODIUM SERPL-SCNC: 141 MMOL/L (ref 135–145)
TRIGL SERPL-MCNC: 63 MG/DL

## 2025-02-13 RX ORDER — GABAPENTIN 100 MG/1
100 CAPSULE ORAL 3 TIMES DAILY
COMMUNITY
Start: 2025-02-13

## 2025-02-13 ASSESSMENT — PAIN SCALES - GENERAL: PAINLEVEL_OUTOF10: NO PAIN (0)

## 2025-02-13 NOTE — PROGRESS NOTES
Preoperative Evaluation  [address], [address]  [facility] [address]  Phone: [phone]  Primary Provider: Rani Callahan MD  Pre-op Performing Provider: Rani Callaahn MD  Feb 13, 2025 2/12/2025   Surgical Information   What procedure is being done? Cataract surgery   Facility or Hospital where procedure/surgery will be performed: [facility] in [place]   Who is doing the procedure / surgery? Dr. Russo   Date of surgery / procedure: 2/18 (R) and 2/25 (L)   Time of surgery / procedure: 11:30   Where do you plan to recover after surgery? at home with family     Fax number for surgical facility: [phone]    Assessment & Plan     The proposed surgical procedure is considered low risk.    Preop general physical exam  Patient is a good surgical candidate for cataract surgery because risk of procedure is low   she is at very high risk of infection because of TNF alpha inhibitors  Her coronary disease is mild at the best and she is very physically active      Cortical age-related cataract of both eyes  As above patient will have the right eye first followed by left    Rheumatoid arthritis, rheumatoid factor status unknown (H)  Patient will stop nonsteroidals today  Her adalimumab infusions and hydroxychloroquine can be continued  - REVIEW OF HEALTH MAINTENANCE PROTOCOL ORDERS    Spinal stenosis of lumbar region with neurogenic claudication  I spoke to her  because patient had foraminectomy followed by perhaps a little crack in the foramen she will use gabapentin for pain    - REVIEW OF HEALTH MAINTENANCE PROTOCOL ORDERS  - DX Bone Density  - gabapentin (NEURONTIN) 100 MG capsule    Coronary artery disease involving native coronary artery of native heart without angina pectoris  6 years ago and angiography showed mild disease but we discussed about increased risk of coronary disease with rheumatoid arthritis  - REVIEW OF HEALTH MAINTENANCE PROTOCOL  ORDERS    Hyperlipidemia LDL goal <70  If she cannot take statins we might have to consider other alternatives  - Lipid panel reflex to direct LDL Fasting  - Comprehensive metabolic panel  - Lipid panel reflex to direct LDL Fasting  - Comprehensive metabolic panel    Osteopenia of multiple sites  We will check a DEXA scan  - DX Bone Density                     Recommendation  Approval given to proceed with proposed procedure, without further diagnostic evaluation.    Corrina Parham is a 69 year old, presenting for the following:  Pre-Op Exam and Recheck Medication (Statin questions, not taking currently, side effects)        HPI related to upcoming procedure: This very nice patient 69 years old who has rheumatoid arthritis and is on TNF alpha inhibitors needs to undergo cataract surgery  She also has severe back pain        2/12/2025   Pre-Op Questionnaire   Have you ever had a heart attack or stroke? No   Have you ever had surgery on your heart or blood vessels, such as a stent placement, a coronary artery bypass, or surgery on an artery in your head, neck, heart, or legs? (!) YES    Do you have chest pain with activity? No   Do you have a history of heart failure? No   Do you currently have a cold, bronchitis or symptoms of other infection? No   Do you have a cough, shortness of breath, or wheezing? No   Do you or anyone in your family have previous history of blood clots? No   Do you or does anyone in your family have a serious bleeding problem such as prolonged bleeding following surgeries or cuts? No   Have you ever had problems with anemia or been told to take iron pills? No   Have you had any abnormal blood loss such as black, tarry or bloody stools, or abnormal vaginal bleeding? No   Have you ever had a blood transfusion? No   Are you willing to have a blood transfusion if it is medically needed before, during, or after your surgery? Yes   Have you or any of your relatives ever had problems with  anesthesia? No   Do you have sleep apnea, excessive snoring or daytime drowsiness? No   Do you have any artifical heart valves or other implanted medical devices like a pacemaker, defibrillator, or continuous glucose monitor? No   Do you have artificial joints? No   Are you allergic to latex? No     Health Care Directive  Patient does not have a Health Care Directive: Patient states has Advance Directive and will bring in a copy to clinic.    Preoperative Review of    reviewed - no record of controlled substances prescribed.          Patient Active Problem List    Diagnosis Date Noted    Family hx colonic polyps 01/10/2022     Priority: Medium    Recurrent cold sores      Priority: Medium    Daily headache      Priority: Medium    Coronary artery disease involving native coronary artery of native heart without angina pectoris      Priority: Medium     cardiac cath 12/8/2020: mild non-obstructive disease      Rheumatoid arthritis, rheumatoid factor status unknown (H)      Priority: Medium      Past Medical History:   Diagnosis Date    BCC (basal cell carcinoma), face     lip and chest    Coronary artery disease involving native coronary artery of native heart without angina pectoris     cardiac cath 12/8/2020: mild non-obstructive disease    Daily headache     Recurrent cold sores     Rheumatoid arthritis, rheumatoid factor status unknown (H)      Past Surgical History:   Procedure Laterality Date    COLONOSCOPY      COLONOSCOPY N/A 12/6/2016    Procedure: COLONOSCOPY;  Surgeon: Philip Santiago MD;  Location:  GI    COLPORRHAPHY N/A 4/7/2015    Procedure: UTEROSACRAL LIGAMENT SUSPENSION PLICATION,  ANTERIOR POSTERIOR POSSIBLE REPAIR;  Surgeon: Diana [place] MD;  Location: [facility];  Service:     CV LEFT HEART CATH N/A 12/8/2020    Procedure: Left Heart Cath;  Surgeon: Aleksander López MD;  Location:  HEART CARDIAC CATH LAB    CV LEFT VENTRICULOGRAM N/A 12/8/2020     Procedure: Left Ventriculogram;  Surgeon: Aleksander López MD;  Location:  HEART CARDIAC CATH LAB    DILATION AND CURETTAGE      ENT SURGERY      T&A    GYN SURGERY      vag hyst    LAMINECTOMY LUMBAR ONE LEVEL Bilateral 8/7/2024    Procedure: Bilateral lumbar 5 to sacral 1 foraminotomy;  Surgeon: Marc Alatorre MD;  Location:  OR    PELVIC LAPAROSCOPY      IN VAGINAL HYSTERECTOMY,UTERUS 250 GMS/< N/A 4/7/2015    Procedure: VAGINAL HYSTERECTOMY RIGHT SALPINGO O-OPHERECTOMY POSTERIOR REPAIR UTEROSACRAL LIGAMENT SUSPENSION CYSTOSCOPY TRANSVAGINAL TAPE OBTURATOR;  Surgeon: Diana Casper MD;  Location: [facility] [place];  Service: Gynecology    TONSILLECTOMY       Current Outpatient Medications   Medication Sig Dispense Refill    Acetaminophen (TYLENOL 8 HOUR PO) Take 1-2 tablets by mouth 3 times daily as needed      Adalimumab-adaz (HYRIMOZ) 40 MG/0.4ML SOAJ Inject subcutaneously every 14 days      aspirin 81 MG EC tablet Take 1 tablet (81 mg) by mouth daily      Calcium 250 MG CAPS Take 4 capsules by mouth 2 times daily      folic acid (FOLVITE) 1 MG tablet Take 2 mg by mouth 2 times daily (2 x 1mg = 2mg)      gabapentin (NEURONTIN) 100 MG capsule Take 1 capsule (100 mg) by mouth 3 times daily.      hydroxychloroquine (PLAQUENIL) 200 MG tablet Take 200 mg by mouth daily.      methotrexate 2.5 MG tablet Take 15 mg by mouth once a week (6 x 2.5mg = 15mg:Wednesdays)      pseudoePHEDrine (SUDAFED) 60 MG tablet Take 60 mg by mouth every morning      vitamin B1 (THIAMINE) 50 MG tablet TAKE 1 TABLET BY MOUTH EVERY  tablet 1    vitamin D3 (CHOLECALCIFEROL) 50 mcg (2000 units) tablet Take 1 tablet by mouth three times a week (Monday, Wednesday & Fridays)         Allergies   Allergen Reactions    Minocycline      Daughter and son reaction    Statins      neuropathy    Tetracycline      Daughter and son allergy        Social History     Tobacco Use    Smoking status: Never    Smokeless tobacco: Never  "  Substance Use Topics    Alcohol use: Yes     Comment: 2-4 per week     Family History   Problem Relation Age of Onset    Peptic Ulcer Disease Mother     Hypertension Mother     Osteoporosis Mother     Diabetes Father     Cancer Father         biliary    Other Cancer Father     Unknown/Adopted Brother     Unknown/Adopted Brother     Crohn's Disease Brother     Diabetes Brother     Coronary Artery Disease Brother     Hypertension Brother     Skin Cancer Brother     Coronary Artery Disease Brother 60    Breast Cancer Maternal Aunt     Breast Cancer Other         Aunt     History   Drug Use No             Review of Systems  Constitutional, HEENT, cardiovascular, pulmonary, GI, , musculoskeletal, neuro, skin, endocrine and psych systems are negative, except as otherwise noted.    Objective    /71 (BP Location: Left arm, Patient Position: Sitting, Cuff Size: Adult Regular)   Pulse 71   Temp (!) 96.4  F (35.8  C) (Temporal)   Resp 18   Ht 1.536 m (5' 0.47\")   Wt 56.6 kg (124 lb 11.2 oz)   SpO2 97%   BMI 23.98 kg/m     Estimated body mass index is 23.98 kg/m  as calculated from the following:    Height as of this encounter: 1.536 m (5' 0.47\").    Weight as of this encounter: 56.6 kg (124 lb 11.2 oz).  Physical Exam  GENERAL: alert and no distress  NECK: no adenopathy, no asymmetry, masses, or scars  RESP: lungs clear to auscultation - no rales, rhonchi or wheezes  CV: regular rate and rhythm, normal S1 S2, no S3 or S4, no murmur, click or rub, no peripheral edema  ABDOMEN: soft, nontender, no hepatosplenomegaly, no masses and bowel sounds normal  MS: no gross musculoskeletal defects noted, no edema    Recent Labs   Lab Test 10/07/24  0911 07/31/24  1132   HGB 12.1 13.1    279    141   POTASSIUM 4.5 5.2   CR 0.60 0.61   A1C 5.4  --         Diagnostics  No labs were ordered during this visit.   No EKG required for low risk surgery (cataract, skin procedure, breast biopsy, etc).    Revised Cardiac " Risk Index (RCRI)  The patient has the following serious cardiovascular risks for perioperative complications:   - Coronary Artery Disease (MI, positive stress test, angina, Qs on EKG) = 1 point     RCRI Interpretation: 1 point: Class II (low risk - 0.9% complication rate)         Signed Electronically by: Rani Callahan MD  A copy of this evaluation report is provided to the requesting physician.

## 2025-02-17 DIAGNOSIS — E78.5 HYPERLIPIDEMIA LDL GOAL <70: Primary | ICD-10-CM

## 2025-02-17 RX ORDER — EZETIMIBE 10 MG/1
10 TABLET ORAL DAILY
Qty: 90 TABLET | Refills: 3 | Status: SHIPPED | OUTPATIENT
Start: 2025-02-17

## 2025-03-31 ENCOUNTER — HOSPITAL ENCOUNTER (OUTPATIENT)
Dept: BONE DENSITY | Facility: CLINIC | Age: 70
Discharge: HOME | End: 2025-03-31
Attending: INTERNAL MEDICINE | Admitting: INTERNAL MEDICINE
Payer: COMMERCIAL

## 2025-03-31 DIAGNOSIS — M48.062 SPINAL STENOSIS OF LUMBAR REGION WITH NEUROGENIC CLAUDICATION: ICD-10-CM

## 2025-03-31 DIAGNOSIS — M85.89 OSTEOPENIA OF MULTIPLE SITES: ICD-10-CM

## 2025-03-31 PROCEDURE — 77080 DXA BONE DENSITY AXIAL: CPT

## 2025-04-14 ENCOUNTER — MYC REFILL (OUTPATIENT)
Dept: FAMILY MEDICINE | Facility: CLINIC | Age: 70
End: 2025-04-14
Payer: COMMERCIAL

## 2025-04-14 DIAGNOSIS — E78.5 HYPERLIPIDEMIA LDL GOAL <70: ICD-10-CM

## 2025-04-14 RX ORDER — EZETIMIBE 10 MG/1
10 TABLET ORAL DAILY
Qty: 90 TABLET | Refills: 3 | OUTPATIENT
Start: 2025-04-14

## 2025-07-14 ENCOUNTER — OFFICE VISIT (OUTPATIENT)
Dept: FAMILY MEDICINE | Facility: CLINIC | Age: 70
End: 2025-07-14
Payer: COMMERCIAL

## 2025-07-14 VITALS
DIASTOLIC BLOOD PRESSURE: 69 MMHG | SYSTOLIC BLOOD PRESSURE: 119 MMHG | HEART RATE: 71 BPM | HEIGHT: 60 IN | RESPIRATION RATE: 16 BRPM | WEIGHT: 124.7 LBS | BODY MASS INDEX: 24.48 KG/M2 | OXYGEN SATURATION: 97 % | TEMPERATURE: 98 F

## 2025-07-14 DIAGNOSIS — Z79.890 HORMONE REPLACEMENT THERAPY: ICD-10-CM

## 2025-07-14 DIAGNOSIS — Z01.818 PREOP GENERAL PHYSICAL EXAM: Primary | ICD-10-CM

## 2025-07-14 DIAGNOSIS — I25.10 CORONARY ARTERY DISEASE INVOLVING NATIVE CORONARY ARTERY OF NATIVE HEART WITHOUT ANGINA PECTORIS: ICD-10-CM

## 2025-07-14 DIAGNOSIS — E78.5 HYPERLIPIDEMIA LDL GOAL <100: ICD-10-CM

## 2025-07-14 DIAGNOSIS — Z78.9 INTOLERANCE OF ORAL BISPHOSPHONATE THERAPY: ICD-10-CM

## 2025-07-14 DIAGNOSIS — R51.9 DAILY HEADACHE: ICD-10-CM

## 2025-07-14 DIAGNOSIS — M06.9 RHEUMATOID ARTHRITIS, RHEUMATOID FACTOR STATUS UNKNOWN (H): ICD-10-CM

## 2025-07-14 DIAGNOSIS — M85.89 OSTEOPENIA OF MULTIPLE SITES: ICD-10-CM

## 2025-07-14 LAB
CALCIUM SERPL-MCNC: 9.9 MG/DL (ref 8.8–10.4)
CHOLEST SERPL-MCNC: 197 MG/DL
CREAT SERPL-MCNC: 0.67 MG/DL (ref 0.51–0.95)
EGFRCR SERPLBLD CKD-EPI 2021: >90 ML/MIN/1.73M2
FASTING STATUS PATIENT QL REPORTED: NO
HDLC SERPL-MCNC: 92 MG/DL
LDLC SERPL CALC-MCNC: 92 MG/DL
NONHDLC SERPL-MCNC: 105 MG/DL
TRIGL SERPL-MCNC: 66 MG/DL
VIT D+METAB SERPL-MCNC: 56 NG/ML (ref 20–50)

## 2025-07-14 PROCEDURE — 99214 OFFICE O/P EST MOD 30 MIN: CPT | Performed by: INTERNAL MEDICINE

## 2025-07-14 PROCEDURE — 36415 COLL VENOUS BLD VENIPUNCTURE: CPT | Performed by: INTERNAL MEDICINE

## 2025-07-14 PROCEDURE — 82565 ASSAY OF CREATININE: CPT | Performed by: INTERNAL MEDICINE

## 2025-07-14 PROCEDURE — 82310 ASSAY OF CALCIUM: CPT | Performed by: INTERNAL MEDICINE

## 2025-07-14 PROCEDURE — G2211 COMPLEX E/M VISIT ADD ON: HCPCS | Performed by: INTERNAL MEDICINE

## 2025-07-14 PROCEDURE — 82306 VITAMIN D 25 HYDROXY: CPT | Performed by: INTERNAL MEDICINE

## 2025-07-14 PROCEDURE — 93000 ELECTROCARDIOGRAM COMPLETE: CPT | Performed by: INTERNAL MEDICINE

## 2025-07-14 PROCEDURE — 80061 LIPID PANEL: CPT | Performed by: INTERNAL MEDICINE

## 2025-07-14 RX ORDER — ESTRADIOL 0.5 MG/1
0.5 TABLET ORAL DAILY
COMMUNITY
Start: 2025-07-14

## 2025-07-14 ASSESSMENT — PAIN SCALES - GENERAL: PAINLEVEL_OUTOF10: MILD PAIN (3)

## 2025-07-14 NOTE — PATIENT INSTRUCTIONS
How to Take Your Medication Before Surgery  Preoperative Medication Instructions   Antiplatelet or Anticoagulation Medication Instructions   - aspirin: Discontinue aspirin 7 days prior to procedure to reduce bleeding risk. It should be resumed postoperatively.     Additional Medication Instructions  We reviewed the medication list and there are no chronic medications that need to be adjusted for this procedure.       Patient Education   Preparing for Your Surgery  For Adults  Getting started  In most cases, a nurse will call to review your health history and instructions. They will give you an arrival time based on your scheduled surgery time. Please be ready to share:  Your doctor's clinic name and phone number  Your medical, surgical, and anesthesia history  A list of allergies and sensitivities  A list of medicines, including herbal treatments and over-the-counter drugs  Whether the patient has a legal guardian (ask how to send us the papers in advance)  Note: You may not receive a call if you were seen at our PAC (Preoperative Assessment Center).  Please tell us if you're pregnant--or if there's any chance you might be pregnant. Some surgeries may injure a fetus (unborn baby), so they require a pregnancy test. Surgeries that are safe for a fetus don't always need a test, and you can choose whether to have one.   Preparing for surgery  Within 10 to 30 days of surgery: Have a pre-op exam (sometimes called an H&P, or History and Physical). This can be done at a clinic or pre-operative center.  If you're having a , you may not need this exam. Talk to your care team.  At your pre-op exam, talk to your care team about all medicines you take. (This includes CBD oil and any drugs, such as THC, marijuana, and other forms of cannabis.) If you need to stop any medicine before surgery, ask when to start taking it again.  This is for your safety. Many medicines and drugs can make you bleed too much during surgery.  Some change how well surgery (anesthesia) drugs work.  Call your insurance company to let them know you're having surgery. (If you don't have insurance, call [phone].)  Call your clinic if there's any change in your health. This includes a scrape or scratch near the surgery site, or any signs of a cold (sore throat, runny nose, cough, rash, fever).  Eating and drinking guidelines  For your safety: Unless your surgeon tells you otherwise, follow the guidelines below.  Eat and drink as normal until 8 hours before you arrive for surgery. After that, no food or milk. You can spit out gum when you arrive.  Drink clear liquids until 2 hours before you arrive. These are liquids you can see through, like water, Gatorade, and Propel Water. They also include plain black coffee and tea (no cream or milk).  No alcohol for 24 hours before you arrive. The night before surgery, stop any drinks that contain THC.  If your care team tells you to take medicine on the morning of surgery, it's okay to take it with a sip of water. No other medicines or drugs are allowed (including CBD oil)--follow your care team's instructions.  If you have questions the day of surgery, call your hospital or surgery center.   Preventing infection  Shower or bathe the night before and the morning of surgery. Follow the instructions your clinic gave you. (If no instructions, use regular soap.)  Don't shave or clip hair near your surgery site. We'll remove the hair if needed.  Don't smoke or vape the morning of surgery. No chewing tobacco for 6 hours before you arrive. A nicotine patch is okay. You may spit out nicotine gum when you arrive.  For some surgeries, the surgeon will tell you to fully quit smoking and nicotine.  We will make every effort to keep you safe from infection. We will:  Clean our hands often with soap and water (or an alcohol-based hand rub).  Clean the skin at your surgery site with a special soap that kills germs.  Give you a  special gown to keep you warm. (Cold raises the risk of infection.)  Wear hair covers, masks, gowns, and gloves during surgery.  Give antibiotic medicine, if prescribed. Not all surgeries need this medicine.  What to bring on the day of surgery  Photo ID and insurance card  Copy of your health care directive, if you have one  Glasses and hearing aids (bring cases)  You can't wear contacts during surgery  Inhaler and eye drops, if you use them (tell us about these when you arrive)  CPAP machine or breathing device, if you use them  A few personal items, if spending the night  If you have . . .  A pacemaker, ICD (cardiac defibrillator), or other implant: Bring the ID card.  An implanted stimulator: Bring the remote control.  A legal guardian: Bring a copy of the certified (court-stamped) guardianship papers.  Please remove any jewelry, including body piercings. Leave jewelry and other valuables at home.  If you're going home the day of surgery  You must have a support person drive you home. They should stay with you overnight, and they may need to help with your self-care.  If you don't have a support person, please tells us as soon as possible. We can help.  After surgery  If it's hard to control your pain or you need more pain medicine, please call your surgeon's office.  Questions?   If you have any questions for your care team, list them here:   ____________________________________________________________________________________________________________________________________________________________________________________________________________________________________________________________  For informational purposes only. Not to replace the advice of your health care provider. Copyright   2003, 2019 [facility]. All rights reserved. Clinically reviewed by Robb Dickens MD. [employer] [id number] - REV 02/25.     You have selected the below site for your Prolia injection. If you did not schedule  this appointment in clinic, please call the number listed below.  Sushma (MAIRA) [phone]

## 2025-07-14 NOTE — PROGRESS NOTES
Preoperative Evaluation  [facility]  [address] [facility], [address]  [facility] [address]  Phone: [phone]  Primary Provider: Rani Callahan MD  Pre-op Performing Provider: Rani Callahan MD  Jul 14, 2025 7/9/2025   Surgical Information   What procedure is being done? Upper eyelid blephosoplasty & temporal hair line small brouptasty   Facility or Hospital where procedure/surgery will be performed: [facility]   Who is doing the procedure / surgery? Dr Michelle Reese   Date of surgery / procedure: 07/29/25   Time of surgery / procedure: ~7:00 a.m.   Where do you plan to recover after surgery? at home with family     Fax number for surgical facility: [phone]    Assessment & Plan     The proposed surgical procedure is considered LOW risk.    Preop general physical exam  Patient needs her dermatochalasis surgery bilaterally and is a good surgical candidate  She needs higher vigilance for infection control and she will hold her aspirin for 7 days before procedure    - EKG 12-lead complete w/read - Clinics    Rheumatoid arthritis, rheumatoid factor status unknown (H)  Patient's rheumatoid arthritis is stable and we discussed about osteoporosis management recheck Bria's osteopenia  She is immune compromised due to TNF alpha inhibitors and methotrexate  - denosumab (PROLIA) injection 60 mg  Patient is also on hydroxychloroquine  Coronary artery disease involving native coronary artery of native heart without angina pectoris  Patient has mild to moderate nonobstructive coronary artery disease which was diagnosed when she had the jaw pain  She is very active and does waterski and other activities   - EKG 12-lead complete w/read - Clinics    Daily headache  headaches have improved    Osteopenia of multiple sites  We discussed about treatment options for osteopenia and Fosamax perhaps may not be a good choice because of her jaw pain  Patient will see her rheumatologist  also  - denosumab (PROLIA) injection 60 mg    Intolerance of oral bisphosphonate therapy    - Creatinine; Future  - Calcium; Future  - Vitamin D Deficiency; Future    Hormone replacement therapy  Patient is taking hormone replacement therapy and we discussed about the risks of coronary artery disease with hormones  I discussed with her to change it to patch she will talk to her gynecologist    - estradiol (ESTRACE) 0.5 MG tablet; Take 1 tablet (0.5 mg) by mouth daily.                   Recommendation  Approval given to proceed with proposed procedure, without further diagnostic evaluation.    Corrina Parham is a 69 year old, presenting for the following:  Pre-Op Exam        HPI: This is a very nice 69 years old woman who despite rheumatoid arthritis is extremely active With water sports and everything   she now needs eyelid surgery     Her coronary artery disease is mild     she also wishes to discuss her DEXA scan          7/9/2025   Pre-Op Questionnaire   Have you ever had a heart attack or stroke? No   Have you ever had surgery on your heart or blood vessels, such as a stent placement, a coronary artery bypass, or surgery on an artery in your head, neck, heart, or legs? No   Do you have chest pain with activity? No   Do you have a history of heart failure? No   Do you currently have a cold, bronchitis or symptoms of other infection? No   Do you have a cough, shortness of breath, or wheezing? No   Do you or anyone in your family have previous history of blood clots? No   Do you or does anyone in your family have a serious bleeding problem such as prolonged bleeding following surgeries or cuts? No   Have you ever had problems with anemia or been told to take iron pills? No   Have you had any abnormal blood loss such as black, tarry or bloody stools, or abnormal vaginal bleeding? No   Have you ever had a blood transfusion? No   Are you willing to have a blood transfusion if it is medically needed before,  during, or after your surgery? Yes   Have you or any of your relatives ever had problems with anesthesia? No   Do you have sleep apnea, excessive snoring or daytime drowsiness? No   Do you have any artifical heart valves or other implanted medical devices like a pacemaker, defibrillator, or continuous glucose monitor? No   Do you have artificial joints? No   Are you allergic to latex? No     Advance Care Planning        Preoperative Review of    reviewed - controlled substances reflected in medication list.          Patient Active Problem List    Diagnosis Date Noted    Family hx colonic polyps 01/10/2022     Priority: Medium    Recurrent cold sores      Priority: Medium    Daily headache      Priority: Medium    Coronary artery disease involving native coronary artery of native heart without angina pectoris      Priority: Medium     cardiac cath 12/8/2020: mild non-obstructive disease      Rheumatoid arthritis, rheumatoid factor status unknown (H)      Priority: Medium      Past Medical History:   Diagnosis Date    BCC (basal cell carcinoma), face     lip and chest    Coronary artery disease involving native coronary artery of native heart without angina pectoris     cardiac cath 12/8/2020: mild non-obstructive disease    Daily headache     Recurrent cold sores     Rheumatoid arthritis, rheumatoid factor status unknown (H)      Past Surgical History:   Procedure Laterality Date    COLONOSCOPY      COLONOSCOPY N/A 12/6/2016    Procedure: COLONOSCOPY;  Surgeon: Philip Santiago MD;  Location:  GI    COLPORRHAPHY N/A 4/7/2015    Procedure: UTEROSACRAL LIGAMENT SUSPENSION PLICATION,  ANTERIOR POSTERIOR POSSIBLE REPAIR;  Surgeon: Diana Casper MD;  [place] [facility] [place];  Service:     CV LEFT HEART CATH N/A 12/8/2020    Procedure: Left Heart Cath;  Surgeon: Aleksander López MD;  Location:  HEART CARDIAC CATH LAB    CV LEFT VENTRICULOGRAM N/A 12/8/2020    Procedure: Left  Ventriculogram;  Surgeon: Aleksander López MD;  Location:  HEART CARDIAC CATH LAB    DILATION AND CURETTAGE      ENT SURGERY      T&A    GYN SURGERY      vag hyst    LAMINECTOMY LUMBAR ONE LEVEL Bilateral 8/7/2024    Procedure: Bilateral lumbar 5 to sacral 1 foraminotomy;  Surgeon: Marc Alatorre MD;  Location:  OR    PELVIC LAPAROSCOPY      IA VAGINAL HYSTERECTOMY,UTERUS 250 GMS/< N/A 4/7/2015    Procedure: VAGINAL HYSTERECTOMY RIGHT SALPINGO O-OPHERECTOMY POSTERIOR REPAIR UTEROSACRAL LIGAMENT SUSPENSION CYSTOSCOPY TRANSVAGINAL TAPE OBTURATOR;  Surgeon: Diana Casper MD;  Location: [facility] - [place];  Service: Gynecology    TONSILLECTOMY       Current Outpatient Medications   Medication Sig Dispense Refill    Acetaminophen (TYLENOL 8 HOUR PO) Take 1-2 tablets by mouth 3 times daily as needed      Adalimumab-adaz (HYRIMOZ) 40 MG/0.4ML SOAJ Inject subcutaneously every 14 days      aspirin 81 MG EC tablet Take 1 tablet (81 mg) by mouth daily      Calcium 250 MG CAPS Take 4 capsules by mouth 2 times daily      ezetimibe (ZETIA) 10 MG tablet Take 1 tablet (10 mg) by mouth daily. 90 tablet 3    folic acid (FOLVITE) 1 MG tablet Take 2 mg by mouth 2 times daily (2 x 1mg = 2mg)      gabapentin (NEURONTIN) 100 MG capsule Take 1 capsule (100 mg) by mouth 3 times daily.      hydroxychloroquine (PLAQUENIL) 200 MG tablet Take 200 mg by mouth daily.      methotrexate 2.5 MG tablet Take 15 mg by mouth once a week (6 x 2.5mg = 15mg:Wednesdays)      pseudoePHEDrine (SUDAFED) 60 MG tablet Take 60 mg by mouth every morning      vitamin B1 (THIAMINE) 50 MG tablet TAKE 1 TABLET BY MOUTH EVERY  tablet 1    vitamin D3 (CHOLECALCIFEROL) 50 mcg (2000 units) tablet Take 1 tablet by mouth three times a week (Monday, Wednesday & Fridays)         Allergies   Allergen Reactions    Minocycline      Daughter and son reaction    Statins      neuropathy    Tetracycline      Daughter and son allergy        Social History  "    Tobacco Use    Smoking status: Never    Smokeless tobacco: Never   Substance Use Topics    Alcohol use: Yes     Comment: 2-4 per week     Family History   Problem Relation Age of Onset    Peptic Ulcer Disease Mother     Hypertension Mother     Osteoporosis Mother     Diabetes Father     Cancer Father         biliary    Other Cancer Father     Unknown/Adopted Brother     Unknown/Adopted Brother     Crohn's Disease Brother     Diabetes Brother     Coronary Artery Disease Brother     Hypertension Brother     Skin Cancer Brother     Coronary Artery Disease Brother 60    Breast Cancer Maternal Aunt     Breast Cancer Other         Aunt     History   Drug Use No             Review of Systems  Constitutional, HEENT, cardiovascular, pulmonary, GI, , musculoskeletal, neuro, skin, endocrine and psych systems are negative, except as otherwise noted.    Objective    /69 (BP Location: Right arm, Patient Position: Sitting, Cuff Size: Adult Regular)   Pulse 71   Temp 98  F (36.7  C) (Temporal)   Resp 16   Ht 1.536 m (5' 0.47\")   Wt 56.6 kg (124 lb 11.2 oz)   SpO2 97%   BMI 23.98 kg/m     Estimated body mass index is 23.98 kg/m  as calculated from the following:    Height as of this encounter: 1.536 m (5' 0.47\").    Weight as of this encounter: 56.6 kg (124 lb 11.2 oz).  Physical Exam  GENERAL: alert and no distress  NECK: no adenopathy, no asymmetry, masses, or scars  RESP: lungs clear to auscultation - no rales, rhonchi or wheezes  CV: regular rate and rhythm, normal S1 S2, no S3 or S4, no murmur, click or rub, no peripheral edema  ABDOMEN: soft, nontender, no hepatosplenomegaly, no masses and bowel sounds normal  MS: no gross musculoskeletal defects noted, no edema    Recent Labs   Lab Test 02/13/25  0906 10/07/24  0911 07/31/24  1132   HGB  --  12.1 13.1   PLT  --  316 279    141 141   POTASSIUM 5.0 4.5 5.2   CR 0.71 0.60 0.61   A1C  --  5.4  --         Diagnostics  No labs were ordered during this " visit.   EKG: sinus bradycardia, Rbbb, no LVH by voltage criteria, unchanged from previous tracings    Revised Cardiac Risk Index (RCRI)  The patient has the following serious cardiovascular risks for perioperative complications:   - Coronary Artery Disease (MI, positive stress test, angina, Qs on EKG) = 1 point     RCRI Interpretation: 0 points: Class I (very low risk - 0.4% complication rate)     The longitudinal plan of care for the diagnosis(es)/condition(s) as documented were addressed during this visit. Due to the added complexity in care, I will continue to support Dione in the subsequent management and with ongoing continuity of care.      Signed Electronically by: Rani Callahan MD  A copy of this evaluation report is provided to the requesting physician.

## 2025-07-14 NOTE — PROGRESS NOTES
The following steps were completed to comply with the REMS program for Prolia:  Reviewed the serious risks of Prolia  and the symptoms of each risk.  Advised patient to seek prompt medical attention if they have signs or symptoms of any of the serious risks.  Patient will be provided a copy of the Medication Guide and Patient Brochure prior to first injection.    Rani Callahan MD

## 2025-07-15 ENCOUNTER — RESULTS FOLLOW-UP (OUTPATIENT)
Dept: FAMILY MEDICINE | Facility: CLINIC | Age: 70
End: 2025-07-15
Payer: COMMERCIAL

## 2025-07-22 ENCOUNTER — TRANSFERRED RECORDS (OUTPATIENT)
Dept: HEALTH INFORMATION MANAGEMENT | Facility: CLINIC | Age: 70
End: 2025-07-22
Payer: COMMERCIAL

## 2025-07-22 LAB
ALT SERPL-CCNC: 17 IU/L (ref 6–32)
AST SERPL-CCNC: 22 U/L (ref 10–35)
CREATININE (EXTERNAL): 0.6 MG/DL (ref 0.5–1.05)

## (undated) DEVICE — CATH DIAG 4FR ANG PIG 538453S

## (undated) DEVICE — NDL SPINAL 18GA 3.5" 405184

## (undated) DEVICE — CATH DIAG 4FR JL 4.5 538417

## (undated) DEVICE — SPONGE SURGIFOAM 12 1972

## (undated) DEVICE — SYR ANGIOGRAPHY MULTIUSE KIT ACIST 014612

## (undated) DEVICE — KIT HAND CONTROL ANGIOTOUCH ACIST 65CM AT-P65

## (undated) DEVICE — TOTE ANGIO CORP PC15AT SAN32CC83O

## (undated) DEVICE — SOL ADH LIQUID BENZOIN SWAB 0.6ML C1544

## (undated) DEVICE — DRAPE IOBAN INCISE 23X17" 6650EZ

## (undated) DEVICE — ESU GROUND PAD UNIVERSAL W/O CORD

## (undated) DEVICE — PREP CHLORAPREP 26ML TINTED HI-LITE ORANGE 930815

## (undated) DEVICE — DRSG STERI STRIP 1X5" R1548

## (undated) DEVICE — LINEN TOWEL PACK X5 5464

## (undated) DEVICE — RX SURGIFLO HEMOSTATIC MATRIX W/THROMBIN 8ML 2994

## (undated) DEVICE — DRAPE MAYO STAND 23X54 8337

## (undated) DEVICE — GLOVE BIOGEL PI ULTRATOUCH SZ 6.5 41165

## (undated) DEVICE — SU VICRYL 2-0 CT-2 27" UND J269H

## (undated) DEVICE — GLOVE BIOGEL PI MICRO INDICATOR UNDERGLOVE SZ 7.0 48970

## (undated) DEVICE — CATH ANGIO INFINITI 3DRC 4FRX100CM 538476

## (undated) DEVICE — DEFIB PRO-PADZ LVP LQD GEL ADULT 8900-2105-01

## (undated) DEVICE — SU ETHILON 2-0 FS 18" 664H

## (undated) DEVICE — MANIFOLD KIT ANGIO AUTOMATED 014613

## (undated) DEVICE — NDL PERC ENTRY THINWALL 18GA 7.0" G00166

## (undated) DEVICE — DRSG GAUZE 4X4" 8044

## (undated) DEVICE — TUBING SUCTION MEDI-VAC SOFT 3/16"X20' N520A

## (undated) DEVICE — SOL WATER IRRIG 1000ML BOTTLE 2F7114

## (undated) DEVICE — POSITIONER PT PRONESAFE HEAD REST W/DERMAPROX INSERT 40599

## (undated) DEVICE — DRAPE SHEET REV FOLD 3/4 9349

## (undated) DEVICE — INTRO SHEATH 4FRX10CM PINNACLE RSS402

## (undated) DEVICE — DRAPE COVER C-ARM SEAMLESS SNAP-KAP 03-KP26 LATEX FREE

## (undated) DEVICE — PACK SPINE SM CUSTOM SNE15SSFSK

## (undated) DEVICE — TOOL DISSECT MIDAS MR8 12CM TELESC MATCH DMD MR8-T12MH30D

## (undated) DEVICE — DRAPE MICROSCOPE LEICA 54X150" AR8033650

## (undated) DEVICE — ESU PENCIL W/HOLSTER E2350H

## (undated) RX ORDER — ONDANSETRON 2 MG/ML
INJECTION INTRAMUSCULAR; INTRAVENOUS
Status: DISPENSED
Start: 2024-08-07

## (undated) RX ORDER — ACETAMINOPHEN 325 MG/1
TABLET ORAL
Status: DISPENSED
Start: 2024-08-07

## (undated) RX ORDER — POTASSIUM CHLORIDE 1500 MG/1
TABLET, EXTENDED RELEASE ORAL
Status: DISPENSED
Start: 2020-12-08

## (undated) RX ORDER — FENTANYL CITRATE 50 UG/ML
INJECTION, SOLUTION INTRAMUSCULAR; INTRAVENOUS
Status: DISPENSED
Start: 2024-08-07

## (undated) RX ORDER — FENTANYL CITRATE 50 UG/ML
INJECTION, SOLUTION INTRAMUSCULAR; INTRAVENOUS
Status: DISPENSED
Start: 2020-12-08

## (undated) RX ORDER — DEXAMETHASONE SODIUM PHOSPHATE 4 MG/ML
INJECTION, SOLUTION INTRA-ARTICULAR; INTRALESIONAL; INTRAMUSCULAR; INTRAVENOUS; SOFT TISSUE
Status: DISPENSED
Start: 2024-08-07